# Patient Record
Sex: FEMALE | Race: BLACK OR AFRICAN AMERICAN | NOT HISPANIC OR LATINO | Employment: FULL TIME | URBAN - METROPOLITAN AREA
[De-identification: names, ages, dates, MRNs, and addresses within clinical notes are randomized per-mention and may not be internally consistent; named-entity substitution may affect disease eponyms.]

---

## 2021-07-20 ENCOUNTER — CLINICAL SUPPORT (OUTPATIENT)
Dept: OTHER | Facility: CLINIC | Age: 51
End: 2021-07-20
Payer: COMMERCIAL

## 2021-07-20 DIAGNOSIS — Z00.8 ENCOUNTER FOR OTHER GENERAL EXAMINATION: ICD-10-CM

## 2021-07-21 VITALS — HEIGHT: 68 IN

## 2021-07-21 LAB
GLUCOSE SERPL-MCNC: 81 MG/DL (ref 60–140)
HDLC SERPL-MCNC: 74 MG/DL
POC CHOLESTEROL, LDL (DOCK): 153 MG/DL
POC CHOLESTEROL, TOTAL: 248 MG/DL
TRIGL SERPL-MCNC: 109 MG/DL

## 2021-07-30 ENCOUNTER — TELEPHONE (OUTPATIENT)
Dept: INTERNAL MEDICINE | Facility: CLINIC | Age: 51
End: 2021-07-30

## 2021-07-30 ENCOUNTER — HOSPITAL ENCOUNTER (OUTPATIENT)
Dept: RADIOLOGY | Facility: HOSPITAL | Age: 51
Discharge: HOME | End: 2021-07-30
Attending: NURSE PRACTITIONER
Payer: COMMERCIAL

## 2021-07-30 ENCOUNTER — OFFICE VISIT (OUTPATIENT)
Dept: INTERNAL MEDICINE | Facility: CLINIC | Age: 51
End: 2021-07-30
Payer: COMMERCIAL

## 2021-07-30 VITALS
SYSTOLIC BLOOD PRESSURE: 138 MMHG | BODY MASS INDEX: 42.33 KG/M2 | DIASTOLIC BLOOD PRESSURE: 90 MMHG | WEIGHT: 279.31 LBS | HEART RATE: 53 BPM | HEIGHT: 68 IN | OXYGEN SATURATION: 99 %

## 2021-07-30 DIAGNOSIS — I83.90 VARICOSE VEINS OF LOWER EXTREMITY, UNSPECIFIED LATERALITY, UNSPECIFIED WHETHER COMPLICATED: ICD-10-CM

## 2021-07-30 DIAGNOSIS — I10 ESSENTIAL HYPERTENSION: ICD-10-CM

## 2021-07-30 DIAGNOSIS — M54.9 DORSALGIA, UNSPECIFIED: ICD-10-CM

## 2021-07-30 DIAGNOSIS — M54.10 RADICULOPATHY, UNSPECIFIED SPINAL REGION: Primary | ICD-10-CM

## 2021-07-30 DIAGNOSIS — M79.89 RIGHT LEG SWELLING: ICD-10-CM

## 2021-07-30 DIAGNOSIS — M54.10 RADICULOPATHY, UNSPECIFIED SPINAL REGION: ICD-10-CM

## 2021-07-30 PROCEDURE — 99204 OFFICE O/P NEW MOD 45 MIN: CPT | Mod: S$GLB,,, | Performed by: NURSE PRACTITIONER

## 2021-07-30 PROCEDURE — 72110 X-RAY EXAM L-2 SPINE 4/>VWS: CPT | Mod: TC

## 2021-07-30 PROCEDURE — 72110 XR LUMBAR SPINE COMPLETE 5 VIEW: ICD-10-PCS | Mod: 26,,, | Performed by: RADIOLOGY

## 2021-07-30 PROCEDURE — 3075F PR MOST RECENT SYSTOLIC BLOOD PRESS GE 130-139MM HG: ICD-10-PCS | Mod: CPTII,S$GLB,, | Performed by: NURSE PRACTITIONER

## 2021-07-30 PROCEDURE — 1160F RVW MEDS BY RX/DR IN RCRD: CPT | Mod: CPTII,S$GLB,, | Performed by: NURSE PRACTITIONER

## 2021-07-30 PROCEDURE — 3080F DIAST BP >= 90 MM HG: CPT | Mod: CPTII,S$GLB,, | Performed by: NURSE PRACTITIONER

## 2021-07-30 PROCEDURE — 72202 X-RAY EXAM SI JOINTS 3/> VWS: CPT | Mod: TC

## 2021-07-30 PROCEDURE — 72202 X-RAY EXAM SI JOINTS 3/> VWS: CPT | Mod: 26,,, | Performed by: RADIOLOGY

## 2021-07-30 PROCEDURE — 1159F MED LIST DOCD IN RCRD: CPT | Mod: CPTII,S$GLB,, | Performed by: NURSE PRACTITIONER

## 2021-07-30 PROCEDURE — 99999 PR PBB SHADOW E&M-EST. PATIENT-LVL IV: ICD-10-PCS | Mod: PBBFAC,,, | Performed by: NURSE PRACTITIONER

## 2021-07-30 PROCEDURE — 99204 PR OFFICE/OUTPT VISIT, NEW, LEVL IV, 45-59 MIN: ICD-10-PCS | Mod: S$GLB,,, | Performed by: NURSE PRACTITIONER

## 2021-07-30 PROCEDURE — 72110 X-RAY EXAM L-2 SPINE 4/>VWS: CPT | Mod: 26,,, | Performed by: RADIOLOGY

## 2021-07-30 PROCEDURE — 3008F PR BODY MASS INDEX (BMI) DOCUMENTED: ICD-10-PCS | Mod: CPTII,S$GLB,, | Performed by: NURSE PRACTITIONER

## 2021-07-30 PROCEDURE — 72202 XR SACROILIAC JOINTS COMPLETE: ICD-10-PCS | Mod: 26,,, | Performed by: RADIOLOGY

## 2021-07-30 PROCEDURE — 3075F SYST BP GE 130 - 139MM HG: CPT | Mod: CPTII,S$GLB,, | Performed by: NURSE PRACTITIONER

## 2021-07-30 PROCEDURE — 99999 PR PBB SHADOW E&M-EST. PATIENT-LVL IV: CPT | Mod: PBBFAC,,, | Performed by: NURSE PRACTITIONER

## 2021-07-30 PROCEDURE — 3008F BODY MASS INDEX DOCD: CPT | Mod: CPTII,S$GLB,, | Performed by: NURSE PRACTITIONER

## 2021-07-30 PROCEDURE — 1126F PR PAIN SEVERITY QUANTIFIED, NO PAIN PRESENT: ICD-10-PCS | Mod: CPTII,S$GLB,, | Performed by: NURSE PRACTITIONER

## 2021-07-30 PROCEDURE — 1160F PR REVIEW ALL MEDS BY PRESCRIBER/CLIN PHARMACIST DOCUMENTED: ICD-10-PCS | Mod: CPTII,S$GLB,, | Performed by: NURSE PRACTITIONER

## 2021-07-30 PROCEDURE — 1159F PR MEDICATION LIST DOCUMENTED IN MEDICAL RECORD: ICD-10-PCS | Mod: CPTII,S$GLB,, | Performed by: NURSE PRACTITIONER

## 2021-07-30 PROCEDURE — 3080F PR MOST RECENT DIASTOLIC BLOOD PRESSURE >= 90 MM HG: ICD-10-PCS | Mod: CPTII,S$GLB,, | Performed by: NURSE PRACTITIONER

## 2021-07-30 PROCEDURE — 1126F AMNT PAIN NOTED NONE PRSNT: CPT | Mod: CPTII,S$GLB,, | Performed by: NURSE PRACTITIONER

## 2021-07-30 RX ORDER — LOSARTAN POTASSIUM 50 MG/1
50 TABLET ORAL DAILY
COMMUNITY
Start: 2021-07-28 | End: 2022-04-26 | Stop reason: SDUPTHER

## 2021-07-30 RX ORDER — MULTIVIT WITH IRON,MINERALS
2 TABLET,CHEWABLE ORAL DAILY
COMMUNITY

## 2021-08-03 ENCOUNTER — HOSPITAL ENCOUNTER (OUTPATIENT)
Dept: CARDIOLOGY | Facility: HOSPITAL | Age: 51
Discharge: HOME | End: 2021-08-03
Attending: NURSE PRACTITIONER
Payer: COMMERCIAL

## 2021-08-03 DIAGNOSIS — I83.90 VARICOSE VEINS OF LOWER EXTREMITY, UNSPECIFIED LATERALITY, UNSPECIFIED WHETHER COMPLICATED: ICD-10-CM

## 2021-08-03 DIAGNOSIS — M54.10 RADICULOPATHY, UNSPECIFIED SPINAL REGION: ICD-10-CM

## 2021-08-03 DIAGNOSIS — I10 ESSENTIAL HYPERTENSION: ICD-10-CM

## 2021-08-03 DIAGNOSIS — M54.9 DORSALGIA, UNSPECIFIED: ICD-10-CM

## 2021-08-03 LAB
LEFT ABI: 1.14
LEFT ARM BP: 169 MMHG
LEFT DORSALIS PEDIS: 193 MMHG
LEFT POSTERIOR TIBIAL: 181 MMHG
RIGHT ABI: 1.15
RIGHT ARM BP: 168 MMHG
RIGHT DORSALIS PEDIS: 195 MMHG
RIGHT POSTERIOR TIBIAL: 194 MMHG

## 2021-08-03 PROCEDURE — 93922 UPR/L XTREMITY ART 2 LEVELS: CPT

## 2021-08-03 PROCEDURE — 93922 ANKLE BRACHIAL INDICES (ABI): ICD-10-PCS | Mod: 26,,, | Performed by: INTERNAL MEDICINE

## 2021-08-03 PROCEDURE — 93922 UPR/L XTREMITY ART 2 LEVELS: CPT | Mod: 26,,, | Performed by: INTERNAL MEDICINE

## 2021-08-11 ENCOUNTER — PATIENT MESSAGE (OUTPATIENT)
Dept: INTERNAL MEDICINE | Facility: CLINIC | Age: 51
End: 2021-08-11

## 2021-08-17 ENCOUNTER — HOSPITAL ENCOUNTER (OUTPATIENT)
Dept: RADIOLOGY | Facility: HOSPITAL | Age: 51
Discharge: HOME | End: 2021-08-17
Attending: NURSE PRACTITIONER
Payer: COMMERCIAL

## 2021-08-17 DIAGNOSIS — I83.90 VARICOSE VEINS OF LOWER EXTREMITY, UNSPECIFIED LATERALITY, UNSPECIFIED WHETHER COMPLICATED: ICD-10-CM

## 2021-08-17 DIAGNOSIS — I10 ESSENTIAL HYPERTENSION: ICD-10-CM

## 2021-08-17 DIAGNOSIS — M54.9 DORSALGIA, UNSPECIFIED: ICD-10-CM

## 2021-08-17 DIAGNOSIS — M54.10 RADICULOPATHY, UNSPECIFIED SPINAL REGION: ICD-10-CM

## 2021-08-17 PROCEDURE — 93971 EXTREMITY STUDY: CPT | Mod: TC,RT

## 2021-08-17 PROCEDURE — 93971 EXTREMITY STUDY: CPT | Mod: 26,RT,, | Performed by: RADIOLOGY

## 2021-08-17 PROCEDURE — 93971 US LOWER EXTREMITY VEINS RIGHT: ICD-10-PCS | Mod: 26,RT,, | Performed by: RADIOLOGY

## 2021-11-02 ENCOUNTER — IMMUNIZATION (OUTPATIENT)
Dept: INTERNAL MEDICINE | Facility: CLINIC | Age: 51
End: 2021-11-02
Payer: COMMERCIAL

## 2021-11-02 DIAGNOSIS — Z23 NEED FOR VACCINATION: Primary | ICD-10-CM

## 2021-11-02 PROCEDURE — 91300 COVID-19, MRNA, LNP-S, PF, 30 MCG/0.3 ML DOSE VACCINE: CPT | Mod: PBBFAC | Performed by: INTERNAL MEDICINE

## 2021-11-02 PROCEDURE — 0003A COVID-19, MRNA, LNP-S, PF, 30 MCG/0.3 ML DOSE VACCINE: CPT | Mod: CV19,PBBFAC | Performed by: INTERNAL MEDICINE

## 2022-03-21 ENCOUNTER — PATIENT MESSAGE (OUTPATIENT)
Dept: ADMINISTRATIVE | Facility: HOSPITAL | Age: 52
End: 2022-03-21
Payer: COMMERCIAL

## 2022-03-22 DIAGNOSIS — Z12.11 SCREENING FOR COLON CANCER: ICD-10-CM

## 2022-04-13 ENCOUNTER — OFFICE VISIT (OUTPATIENT)
Dept: OPTOMETRY | Facility: CLINIC | Age: 52
End: 2022-04-13
Payer: COMMERCIAL

## 2022-04-13 DIAGNOSIS — Z01.00 EXAMINATION OF EYES AND VISION: Primary | ICD-10-CM

## 2022-04-13 DIAGNOSIS — H52.03 HYPEROPIA WITH PRESBYOPIA, BILATERAL: ICD-10-CM

## 2022-04-13 DIAGNOSIS — H52.4 HYPEROPIA WITH PRESBYOPIA, BILATERAL: ICD-10-CM

## 2022-04-13 PROCEDURE — 99999 PR PBB SHADOW E&M-EST. PATIENT-LVL II: ICD-10-PCS | Mod: PBBFAC,,, | Performed by: OPTOMETRIST

## 2022-04-13 PROCEDURE — 92004 PR EYE EXAM, NEW PATIENT,COMPREHESV: ICD-10-PCS | Mod: S$GLB,,, | Performed by: OPTOMETRIST

## 2022-04-13 PROCEDURE — 92015 DETERMINE REFRACTIVE STATE: CPT | Mod: S$GLB,,, | Performed by: OPTOMETRIST

## 2022-04-13 PROCEDURE — 92004 COMPRE OPH EXAM NEW PT 1/>: CPT | Mod: S$GLB,,, | Performed by: OPTOMETRIST

## 2022-04-13 PROCEDURE — 99999 PR PBB SHADOW E&M-EST. PATIENT-LVL II: CPT | Mod: PBBFAC,,, | Performed by: OPTOMETRIST

## 2022-04-13 PROCEDURE — 92015 PR REFRACTION: ICD-10-PCS | Mod: S$GLB,,, | Performed by: OPTOMETRIST

## 2022-04-13 NOTE — PROGRESS NOTES
Subjective:       Patient ID: Gabriella Gilbert is a 51 y.o. female      Chief Complaint   Patient presents with    Concerns About Ocular Health     History of Present Illness  Dls: 1 yr  vision works     52 y/o female presents today with c/o blurry vision at distance and near ou.   Pt wears pal's.     No tearing  No itching   No burning  No pain  No ha's  No floaters  No flashes    Eye meds  None       Assessment/Plan:     1. Examination of eyes and vision  Eyemed vision    2. Hyperopia with presbyopia, bilateral  Educated patient on refractive error and discussed lens options. Dispensed updated spectacle Rx. Educated about adaptation period to new specs.    Eyeglass Final Rx     Eyeglass Final Rx       Sphere Cylinder Axis Add    Right [place] +0.50 070 +2.00    Left +0.75 Sphere  +2.00    Expiration Date: 4/13/2023                  Follow up in about 1 year (around 4/13/2023).

## 2022-04-20 ENCOUNTER — OFFICE VISIT (OUTPATIENT)
Dept: URGENT CARE | Facility: CLINIC | Age: 52
End: 2022-04-20
Payer: COMMERCIAL

## 2022-04-20 VITALS
WEIGHT: 280 LBS | RESPIRATION RATE: 18 BRPM | OXYGEN SATURATION: 98 % | SYSTOLIC BLOOD PRESSURE: 156 MMHG | DIASTOLIC BLOOD PRESSURE: 79 MMHG | BODY MASS INDEX: 41.47 KG/M2 | HEIGHT: 69 IN | TEMPERATURE: 98 F | HEART RATE: 76 BPM

## 2022-04-20 DIAGNOSIS — R51.9 NONINTRACTABLE EPISODIC HEADACHE, UNSPECIFIED HEADACHE TYPE: ICD-10-CM

## 2022-04-20 DIAGNOSIS — I10 ELEVATED BLOOD PRESSURE READING IN OFFICE WITH DIAGNOSIS OF HYPERTENSION: Primary | ICD-10-CM

## 2022-04-20 PROCEDURE — 3077F PR MOST RECENT SYSTOLIC BLOOD PRESSURE >= 140 MM HG: ICD-10-PCS | Mod: CPTII,S$GLB,, | Performed by: PHYSICIAN ASSISTANT

## 2022-04-20 PROCEDURE — 1160F PR REVIEW ALL MEDS BY PRESCRIBER/CLIN PHARMACIST DOCUMENTED: ICD-10-PCS | Mod: CPTII,S$GLB,, | Performed by: PHYSICIAN ASSISTANT

## 2022-04-20 PROCEDURE — 3008F BODY MASS INDEX DOCD: CPT | Mod: CPTII,S$GLB,, | Performed by: PHYSICIAN ASSISTANT

## 2022-04-20 PROCEDURE — 99213 PR OFFICE/OUTPT VISIT, EST, LEVL III, 20-29 MIN: ICD-10-PCS | Mod: S$GLB,,, | Performed by: PHYSICIAN ASSISTANT

## 2022-04-20 PROCEDURE — 1159F MED LIST DOCD IN RCRD: CPT | Mod: CPTII,S$GLB,, | Performed by: PHYSICIAN ASSISTANT

## 2022-04-20 PROCEDURE — 3078F PR MOST RECENT DIASTOLIC BLOOD PRESSURE < 80 MM HG: ICD-10-PCS | Mod: CPTII,S$GLB,, | Performed by: PHYSICIAN ASSISTANT

## 2022-04-20 PROCEDURE — 3008F PR BODY MASS INDEX (BMI) DOCUMENTED: ICD-10-PCS | Mod: CPTII,S$GLB,, | Performed by: PHYSICIAN ASSISTANT

## 2022-04-20 PROCEDURE — 3078F DIAST BP <80 MM HG: CPT | Mod: CPTII,S$GLB,, | Performed by: PHYSICIAN ASSISTANT

## 2022-04-20 PROCEDURE — 1160F RVW MEDS BY RX/DR IN RCRD: CPT | Mod: CPTII,S$GLB,, | Performed by: PHYSICIAN ASSISTANT

## 2022-04-20 PROCEDURE — 4010F ACE/ARB THERAPY RXD/TAKEN: CPT | Mod: CPTII,S$GLB,, | Performed by: PHYSICIAN ASSISTANT

## 2022-04-20 PROCEDURE — 3077F SYST BP >= 140 MM HG: CPT | Mod: CPTII,S$GLB,, | Performed by: PHYSICIAN ASSISTANT

## 2022-04-20 PROCEDURE — 1159F PR MEDICATION LIST DOCUMENTED IN MEDICAL RECORD: ICD-10-PCS | Mod: CPTII,S$GLB,, | Performed by: PHYSICIAN ASSISTANT

## 2022-04-20 PROCEDURE — 99213 OFFICE O/P EST LOW 20 MIN: CPT | Mod: S$GLB,,, | Performed by: PHYSICIAN ASSISTANT

## 2022-04-20 PROCEDURE — 4010F PR ACE/ARB THEARPY RXD/TAKEN: ICD-10-PCS | Mod: CPTII,S$GLB,, | Performed by: PHYSICIAN ASSISTANT

## 2022-04-20 NOTE — PATIENT INSTRUCTIONS
- Rest.    - Drink plenty of fluids.    - Acetaminophen (tylenol) directed as needed for fever/pain. Avoid tylenol if you have a history of liver disease. Do not take ibuprofen if you have a history of GI bleeding, kidney disease, or if you take blood thinners.     - losartan can be taken at maximum dose of 100 mg daily.  - Follow up with your PCP or specialty clinic as directed in the next 1-2 weeks if not improved or as needed.  You can call [phone] to schedule an appointment with the appropriate provider.    - Go to the ER or seek medical attention immediately if you develop new or worsening symptoms.     - You must understand that you have received an Urgent Care treatment only and that you may be released before all of your medical problems are known or treated.   - You, the patient, will arrange for follow up care as instructed.   - If your condition worsens or fails to improve we recommend that you receive another evaluation at the ER immediately or contact your PCP to discuss your concerns or return here.    Elevated Blood Pressure  Your blood pressure was elevated during your visit to the urgent care.  It was not so high that immediate care was needed but it is recommended that you monitor your blood pressure over the next week or two to make sure that it is not staying elevated.  Please have your blood pressure taken 2-3 times daily at different times of the day.  Write all of those blood pressures down and record the time that they were taken.  Keep all that information and take it with you to see your Primary Care Physician.  If your blood pressure is consistently above 140/90 you will need to follow up with your PCP more quickly

## 2022-04-20 NOTE — PROGRESS NOTES
"Subjective:       Patient ID: Gabriella Gilbert is a 51 y.o. female.    Vitals:  height is 5' 9" (1.753 m) and weight is 127 kg (280 lb). Her oral temperature is 97.9 °F (36.6 °C). Her blood pressure is 156/79 (abnormal) and her pulse is 76. Her respiration is 18 and oxygen saturation is 98%.     Chief Complaint: Headache      Patient presents for evaluation of elevated blood pressure headache.  Patient states that about 3-4 weeks ago she increased losartan from 50 mg to 75 mg.  Since then, her blood pressure has been very well controlled with  Readings usually from 110-120 systolic.  However, patient states that today she had a bad headache, so she checked her blood pressure and it was 160/110.  She then took another 25 mg of her losartan to total 100 mg for today.  Of note, patient states that she has had headaches intermittently for the past few weeks.  She does note that 1 month ago she started a new diet with multiple new supplements, many of which contain caffeine.  Patient states that she did not take these today.  Denies any other new associated symptoms.  Patient states that she did have a lot going on earlier today as she had to be in court.     Headache   This is a new problem. The current episode started 1 to 4 weeks ago. The problem has been gradually improving. The quality of the pain is described as pulsating and throbbing. The pain is at a severity of 8/10. Pertinent negatives include no abdominal pain, abnormal behavior, anorexia, back pain, blurred vision, coughing, dizziness, drainage, ear pain, eye pain, eye redness, eye watering, facial sweating, fever, hearing loss, insomnia, loss of balance, muscle aches, nausea, neck pain, numbness, phonophobia, photophobia, rhinorrhea, scalp tenderness, seizures, sinus pressure, sore throat, swollen glands, tingling, tinnitus, visual change, vomiting, weakness or weight loss. Nothing aggravates the symptoms. Treatments tried: pt took second dose of losartan. The " treatment provided mild relief. There is no history of migraine headaches.       Constitution: Negative for chills, sweating, fatigue and fever.   HENT: Negative for ear pain, tinnitus, hearing loss, congestion, sinus pressure and sore throat.    Neck: Negative for neck pain and neck stiffness.   Cardiovascular: Negative for chest pain, leg swelling and palpitations.   Eyes: Negative for eye itching, eye pain, eye redness, photophobia and blurred vision.   Respiratory: Negative for cough, sputum production and shortness of breath.    Gastrointestinal: Negative for abdominal pain, nausea and vomiting.   Genitourinary: Negative for dysuria, frequency, urgency, flank pain and hematuria.   Musculoskeletal: Negative for back pain.   Skin: Negative for color change and rash.   Neurological: Positive for headaches. Negative for dizziness, history of vertigo, light-headedness, passing out, facial drooping, speech difficulty, coordination disturbances, loss of balance, history of migraines, disorientation, altered mental status, loss of consciousness, numbness, tingling, seizures and tremors.   Psychiatric/Behavioral: Negative for altered mental status and disorientation. The patient does not have insomnia.        Objective:      Physical Exam   Constitutional: She is oriented to person, place, and time. She appears well-developed.  Non-toxic appearance. She does not appear ill. No distress.   HENT:   Head: Normocephalic and atraumatic.   Ears:   Right Ear: Hearing, tympanic membrane, external ear and ear canal normal.   Left Ear: Hearing, tympanic membrane, external ear and ear canal normal.   Nose: Nose normal. No mucosal edema, rhinorrhea or nasal deformity. No epistaxis. Right sinus exhibits no maxillary sinus tenderness and no frontal sinus tenderness. Left sinus exhibits no maxillary sinus tenderness and no frontal sinus tenderness.   Mouth/Throat: Uvula is midline, oropharynx is clear and moist and mucous membranes  are normal. No trismus in the jaw. Normal dentition. No uvula swelling. No posterior oropharyngeal erythema.   Eyes: Conjunctivae, EOM and lids are normal. Pupils are equal, round, and reactive to light. No scleral icterus.   Neck: Trachea normal and phonation normal. Neck supple. No neck rigidity present.   Cardiovascular: Normal rate, regular rhythm, normal heart sounds and normal pulses.   Pulmonary/Chest: Effort normal and breath sounds normal. No respiratory distress.   Abdominal: Normal appearance and bowel sounds are normal. She exhibits no distension. Soft. There is no abdominal tenderness.   Musculoskeletal: Normal range of motion.         General: No deformity. Normal range of motion.      Right lower leg: No edema.      Left lower leg: No edema.   Neurological: no focal deficit. She is alert and oriented to person, place, and time. She displays no weakness, no atrophy, facial symmetry and no dysarthria. No cranial nerve deficit or sensory deficit. She exhibits normal muscle tone. She displays no seizure activity. Coordination and gait normal. GCS eye subscore is 4. GCS verbal subscore is 5. GCS motor subscore is 6.   Skin: Skin is warm, dry, intact, not diaphoretic and not pale.   Psychiatric: Her speech is normal and behavior is normal. Judgment and thought content normal.   Nursing note and vitals reviewed.        Assessment:       1. Elevated blood pressure reading in office with diagnosis of hypertension    2. Nonintractable episodic headache, unspecified headache type          Plan:        instructed to increase fluids.  Monitor salt intake.  Blood pressure elevated today at 156/79.  Discussed maximum dose of losartan is 100 mg.  Instructed to monitor blood pressure at home, keep a log.  If consistently elevated greater than 140/90, follow up closely with primary care.  However, before today she has not consistently had elevated blood pressure reading the past month, so I would not recommend adding  another blood pressure medication based on this.  No red flag signs or symptoms at this time.  Patient appears well on exam and rest of vitals are within normal limits.   Discussed that also new supplements / diet could be contributing to headaches, as well as dehydration or excess caffeine intake/ withdrawal could be contributing. All questions answered.  Patient expressed understanding, agrees with plan of care.   Elevated blood pressure reading in office with diagnosis of hypertension    Nonintractable episodic headache, unspecified headache type      Patient Instructions   - Rest.    - Drink plenty of fluids.    - Acetaminophen (tylenol) directed as needed for fever/pain. Avoid tylenol if you have a history of liver disease. Do not take ibuprofen if you have a history of GI bleeding, kidney disease, or if you take blood thinners.     - losartan can be taken at maximum dose of 100 mg daily.  - Follow up with your PCP or specialty clinic as directed in the next 1-2 weeks if not improved or as needed.  You can call [phone] to schedule an appointment with the appropriate provider.    - Go to the ER or seek medical attention immediately if you develop new or worsening symptoms.     - You must understand that you have received an Urgent Care treatment only and that you may be released before all of your medical problems are known or treated.   - You, the patient, will arrange for follow up care as instructed.   - If your condition worsens or fails to improve we recommend that you receive another evaluation at the ER immediately or contact your PCP to discuss your concerns or return here.    Elevated Blood Pressure  Your blood pressure was elevated during your visit to the urgent care.  It was not so high that immediate care was needed but it is recommended that you monitor your blood pressure over the next week or two to make sure that it is not staying elevated.  Please have your blood pressure taken 2-3 times  daily at different times of the day.  Write all of those blood pressures down and record the time that they were taken.  Keep all that information and take it with you to see your Primary Care Physician.  If your blood pressure is consistently above 140/90 you will need to follow up with your PCP more quickly

## 2022-04-22 ENCOUNTER — TELEPHONE (OUTPATIENT)
Dept: URGENT CARE | Facility: CLINIC | Age: 52
End: 2022-04-22
Payer: COMMERCIAL

## 2022-04-24 NOTE — PROGRESS NOTES
"INTERNAL MEDICINE CLINIC - SAME DAY APPOINTMENT  Progress Note    PRESENTING HISTORY     PCP: TAYLER Lizama    Chief Complaint/Reason for Visit:   No chief complaint on file.      History of Present Illness & ROS : Ms. Gabriella Gilbert is a 51 y.o. female.    Same day appt.   No longer has a primary.   Very pleasant lady.   Reports that "was doing well with the Losartan 50, up until the past 3 weeks, with some stress" in life that she feels may have resulted in uncontrolled, symptomatic (headaches) levels, resulting in her self adjusting the dose to 75 mg and continuing to monitor with most recent being 120s/70s as of 'last night'. She is consistent with taking all of her medications.   No resting or with activity SOB, dizziness or chest pain.     Review of Systems:  Eyes: denies visual changes at this time denies floaters   ENT: no nasal congestion or sore throat  Respiratory: no cough or shorness of breath  Cardiovascular: no chest pain or palpitations  Gastrointestinal: no nausea or vomiting, no abdominal pain or change in bowel habits  Genitourinary: no hematuria or dysuria; denies frequency  Hematologic/Lymphatic: no easy bruising or lymphadenopathy  Musculoskeletal: no arthralgias or myalgias  Neurological: no seizures or tremors  Endocrine: no heat or cold intolerance      PAST HISTORY:     Past Medical History:   Diagnosis Date    Hypertension        Past Surgical History:   Procedure Laterality Date    BARIATRIC SURGERY      2018       Family History   Problem Relation Age of Onset    Thyroid cancer Mother     Hypertension Mother     Cataracts Mother     No Known Problems Father     No Known Problems Sister     No Known Problems Brother     No Known Problems Maternal Aunt     No Known Problems Maternal Uncle     No Known Problems Paternal Aunt     No Known Problems Paternal Uncle     Cataracts Maternal Grandmother     No Known Problems Maternal Grandfather     No Known Problems " Paternal Grandmother     No Known Problems Paternal Grandfather        Social History     Socioeconomic History    Marital status:    Tobacco Use    Smoking status: Never Smoker    Smokeless tobacco: Never Used   Substance and Sexual Activity    Alcohol use: Yes     Comment: rarely       MEDICATIONS & ALLERGIES:     Current Outpatient Medications on File Prior to Visit   Medication Sig Dispense Refill    losartan (COZAAR) 50 MG tablet Take 50 mg by mouth once daily.      pediatric multivit-iron-min (FLINTSTONES COMPLETE, IRON,) Chew Take 2 tablets by mouth once daily.       No current facility-administered medications on file prior to visit.        Review of patient's allergies indicates:  No Known Allergies    Medications Reconciliation:   I have reconciled the patient's home medications with the patient/family. I have updated all changes.  Refer to After-Visit Medication List.    OBJECTIVE:     Vital Signs:  There were no vitals filed for this visit.  Wt Readings from Last 3 Encounters:   04/20/22 1325 127 kg (280 lb)   07/30/21 0700 126.7 kg (279 lb 5.2 oz)     There is no height or weight on file to calculate BMI.   Wt Readings from Last 3 Encounters:   04/20/22 127 kg (280 lb)   07/30/21 126.7 kg (279 lb 5.2 oz)     Temp Readings from Last 3 Encounters:   04/20/22 97.9 °F (36.6 °C) (Oral)     BP Readings from Last 3 Encounters:   04/20/22 (!) 156/79   07/30/21 (!) 138/90     Pulse Readings from Last 3 Encounters:   04/20/22 76   07/30/21 (!) 53        Physical Exam:  (Focused Exa,)  General: Well developed, well nourished. No distress.  HEENT: Head is normocephalic, atraumatic  Eyes: Clear conjunctiva.  Neck: Supple, symmetrical neck; trachea midline.  Cardiovascular: Heart with regular rate and rhythm. No murmurs, gallops or rubs  Extremities: No pitting LE edema. Pulses 2+ and symmetric.   Skin: Skin color, texture, turgor normal. No rashes.  Musculoskeletal: Normal gait.   Neurologic: Normal  strength and tone. No focal numbness or weakness.   Psychiatric: Not depressed.        Laboratory  Lab Results   Component Value Date    WBC 3.70 (L) 07/30/2021    HGB 12.8 07/30/2021    HCT 40.1 07/30/2021     07/30/2021    ALT 11 07/30/2021    AST 21 07/30/2021     07/30/2021    K 3.8 07/30/2021     07/30/2021    CREATININE 0.8 07/30/2021    BUN 11 07/30/2021    CO2 28 07/30/2021    TSH 1.111 07/30/2021    HGBA1C 5.1 07/30/2021         ASSESSMENT & PLAN:     Same day appt.     Hypertension, unspecified type, primary, essential:   4/2022: BP: 156/79 in  appt.  Today: 136/88 (asymptomatic; has been on 75 mg daily for the past 3 weeks; continue; recommend that she messages me via portal in 2 weeks with her continued BP  response to therapy; if needed, consider adding low dose HCTZ to regimen; checking renal and electrolyte markers today to see if permissible in the event of needing to do so)  -     Basic Metabolic Panel; Future; Expected date: 04/26/2022 (surveillance to ascertain no imbalance with electrolytes)  -     losartan (COZAAR) 50 MG tablet; Take 1.5 tablets (75 mg total) by mouth once daily.  Dispense: 180 tablet; Refill:     *Due Annual in 7/2022. She will establish with new PCP. Former is no longer with [facility].          Medication List          Accurate as of April 26, 2022 10:33 AM. If you have any questions, ask your nurse or doctor.            CHANGE how you take these medications    losartan 50 MG tablet  Commonly known as: COZAAR  Take 1.5 tablets (75 mg total) by mouth once daily.  What changed: how much to take  Changed by: TAYLER Lizama        CONTINUE taking these medications    FLINTSTONES COMPLETE (IRON) Chew  Generic drug: pediatric multivit-iron-min           Where to Get Your Medications      These medications were sent to [facility]/pharmacy #[id number] - [place], [place] - [address]  [address] [place] [address]    Phone: [phone]    · losartan 50 MG tablet       Signing Physician:  TAYLER Lizama

## 2022-04-26 ENCOUNTER — LAB VISIT (OUTPATIENT)
Dept: LAB | Facility: HOSPITAL | Age: 52
End: 2022-04-26
Payer: COMMERCIAL

## 2022-04-26 ENCOUNTER — OFFICE VISIT (OUTPATIENT)
Dept: INTERNAL MEDICINE | Facility: CLINIC | Age: 52
End: 2022-04-26
Payer: COMMERCIAL

## 2022-04-26 VITALS
BODY MASS INDEX: 43.14 KG/M2 | OXYGEN SATURATION: 100 % | HEART RATE: 52 BPM | HEIGHT: 69 IN | SYSTOLIC BLOOD PRESSURE: 136 MMHG | DIASTOLIC BLOOD PRESSURE: 88 MMHG | WEIGHT: 291.25 LBS

## 2022-04-26 DIAGNOSIS — I10 HYPERTENSION, UNSPECIFIED TYPE: ICD-10-CM

## 2022-04-26 DIAGNOSIS — I10 HYPERTENSION, UNSPECIFIED TYPE: Primary | ICD-10-CM

## 2022-04-26 LAB
ANION GAP SERPL CALC-SCNC: 10 MMOL/L (ref 8–16)
BUN SERPL-MCNC: 12 MG/DL (ref 6–20)
CALCIUM SERPL-MCNC: 9.6 MG/DL (ref 8.7–10.5)
CHLORIDE SERPL-SCNC: 101 MMOL/L (ref 95–110)
CO2 SERPL-SCNC: 31 MMOL/L (ref 23–29)
CREAT SERPL-MCNC: 0.7 MG/DL (ref 0.5–1.4)
EST. GFR  (AFRICAN AMERICAN): >60 ML/MIN/1.73 M^2
EST. GFR  (NON AFRICAN AMERICAN): >60 ML/MIN/1.73 M^2
GLUCOSE SERPL-MCNC: 73 MG/DL (ref 70–110)
POTASSIUM SERPL-SCNC: 4 MMOL/L (ref 3.5–5.1)
SODIUM SERPL-SCNC: 142 MMOL/L (ref 136–145)

## 2022-04-26 PROCEDURE — 3075F PR MOST RECENT SYSTOLIC BLOOD PRESS GE 130-139MM HG: ICD-10-PCS | Mod: CPTII,S$GLB,, | Performed by: NURSE PRACTITIONER

## 2022-04-26 PROCEDURE — 99999 PR PBB SHADOW E&M-EST. PATIENT-LVL III: CPT | Mod: PBBFAC,,, | Performed by: NURSE PRACTITIONER

## 2022-04-26 PROCEDURE — 80048 BASIC METABOLIC PNL TOTAL CA: CPT | Performed by: NURSE PRACTITIONER

## 2022-04-26 PROCEDURE — 3079F PR MOST RECENT DIASTOLIC BLOOD PRESSURE 80-89 MM HG: ICD-10-PCS | Mod: CPTII,S$GLB,, | Performed by: NURSE PRACTITIONER

## 2022-04-26 PROCEDURE — 36415 COLL VENOUS BLD VENIPUNCTURE: CPT | Performed by: NURSE PRACTITIONER

## 2022-04-26 PROCEDURE — 3079F DIAST BP 80-89 MM HG: CPT | Mod: CPTII,S$GLB,, | Performed by: NURSE PRACTITIONER

## 2022-04-26 PROCEDURE — 3075F SYST BP GE 130 - 139MM HG: CPT | Mod: CPTII,S$GLB,, | Performed by: NURSE PRACTITIONER

## 2022-04-26 PROCEDURE — 4010F ACE/ARB THERAPY RXD/TAKEN: CPT | Mod: CPTII,S$GLB,, | Performed by: NURSE PRACTITIONER

## 2022-04-26 PROCEDURE — 99214 OFFICE O/P EST MOD 30 MIN: CPT | Mod: S$GLB,,, | Performed by: NURSE PRACTITIONER

## 2022-04-26 PROCEDURE — 99999 PR PBB SHADOW E&M-EST. PATIENT-LVL III: ICD-10-PCS | Mod: PBBFAC,,, | Performed by: NURSE PRACTITIONER

## 2022-04-26 PROCEDURE — 4010F PR ACE/ARB THEARPY RXD/TAKEN: ICD-10-PCS | Mod: CPTII,S$GLB,, | Performed by: NURSE PRACTITIONER

## 2022-04-26 PROCEDURE — 1159F PR MEDICATION LIST DOCUMENTED IN MEDICAL RECORD: ICD-10-PCS | Mod: CPTII,S$GLB,, | Performed by: NURSE PRACTITIONER

## 2022-04-26 PROCEDURE — 99214 PR OFFICE/OUTPT VISIT, EST, LEVL IV, 30-39 MIN: ICD-10-PCS | Mod: S$GLB,,, | Performed by: NURSE PRACTITIONER

## 2022-04-26 PROCEDURE — 3008F PR BODY MASS INDEX (BMI) DOCUMENTED: ICD-10-PCS | Mod: CPTII,S$GLB,, | Performed by: NURSE PRACTITIONER

## 2022-04-26 PROCEDURE — 3008F BODY MASS INDEX DOCD: CPT | Mod: CPTII,S$GLB,, | Performed by: NURSE PRACTITIONER

## 2022-04-26 PROCEDURE — 1159F MED LIST DOCD IN RCRD: CPT | Mod: CPTII,S$GLB,, | Performed by: NURSE PRACTITIONER

## 2022-04-26 RX ORDER — LOSARTAN POTASSIUM 50 MG/1
75 TABLET ORAL DAILY
Qty: 180 TABLET | Refills: 1 | Status: SHIPPED | OUTPATIENT
Start: 2022-04-26 | End: 2022-11-23

## 2022-04-27 ENCOUNTER — TELEPHONE (OUTPATIENT)
Dept: INTERNAL MEDICINE | Facility: CLINIC | Age: 52
End: 2022-04-27
Payer: COMMERCIAL

## 2022-04-27 ENCOUNTER — PATIENT MESSAGE (OUTPATIENT)
Dept: INTERNAL MEDICINE | Facility: CLINIC | Age: 52
End: 2022-04-27
Payer: COMMERCIAL

## 2022-06-30 ENCOUNTER — TELEPHONE (OUTPATIENT)
Dept: PRIMARY CARE CLINIC | Facility: CLINIC | Age: 52
End: 2022-06-30
Payer: COMMERCIAL

## 2022-07-06 ENCOUNTER — TELEPHONE (OUTPATIENT)
Dept: PRIMARY CARE CLINIC | Facility: CLINIC | Age: 52
End: 2022-07-06
Payer: COMMERCIAL

## 2022-07-06 NOTE — TELEPHONE ENCOUNTER
Called pt to introduce the [facility] and schedule new pt appt no answer LVM to call me [phone] and we can schedule appt

## 2023-04-06 ENCOUNTER — PATIENT MESSAGE (OUTPATIENT)
Dept: INTERNAL MEDICINE | Facility: CLINIC | Age: 53
End: 2023-04-06
Payer: COMMERCIAL

## 2023-04-10 NOTE — PROGRESS NOTES
ANNUAL VISIT NOTE     PRESENTING HISTORY     Reason for Visit:  Annual visit.    No chief complaint on file.    History of Present Illness & ROS: Ms. Gabriella Gilbert is a 52 y.o. female.  Annual.   Very pleasant lady.   Reports that she is consistent with taking her prescribed blood pressure medications,admits to falling through on follow ups. She is not fasting for labs today and will return.   She has gained weight since 4/2022, ~ 20 #s. No SOB, cough or chest wall discomforts. She is reportedly, 'trying to loss, but cannot and legs have had some swelling'. She is with voiced frustration in regards to her wt gain.    Also reports that tried Melatonin for sleep, 'does not help'. Request to be started on something different.   She will return to establish medical care with a new PCP accepting new patients.     Review of Systems:  Eyes: denies visual changes at this time denies floaters   ENT: no nasal congestion or sore throat  Respiratory: no cough or shorness of breath  Cardiovascular: no chest pain or palpitations  Gastrointestinal: no nausea or vomiting, no abdominal pain or change in bowel habits  Genitourinary: no hematuria or dysuria; denies frequency  Hematologic/Lymphatic: no easy bruising or lymphadenopathy  Musculoskeletal: no arthralgias or myalgias  Neurological: no seizures or tremors  Endocrine: no heat or cold intolerance    PAST HISTORY:     Past Medical History:   Diagnosis Date    Hypertension        Past Surgical History:   Procedure Laterality Date    BARIATRIC SURGERY      2018       Family History   Problem Relation Age of Onset    Thyroid cancer Mother     Hypertension Mother     Cataracts Mother     No Known Problems Father     No Known Problems Sister     No Known Problems Brother     No Known Problems Maternal Aunt     No Known Problems Maternal Uncle     No Known Problems Paternal Aunt     No Known Problems Paternal Uncle     Cataracts Maternal Grandmother     No Known Problems Maternal  Grandfather     No Known Problems Paternal Grandmother     No Known Problems Paternal Grandfather        Social History     Socioeconomic History    Marital status:    Tobacco Use    Smoking status: Never    Smokeless tobacco: Never   Substance and Sexual Activity    Alcohol use: Yes     Comment: rarely       MEDICATIONS & ALLERGIES:     Current Outpatient Medications on File Prior to Visit   Medication Sig Dispense Refill    losartan (COZAAR) 50 MG tablet TAKE 1.5 TABLETS (75 MG TOTAL) BY MOUTH ONCE DAILY 135 tablet 2    pediatric multivit-iron-min (FLINTSTONES COMPLETE, IRON,) Chew Take 2 tablets by mouth once daily.       No current facility-administered medications on file prior to visit.        Review of patient's allergies indicates:  No Known Allergies    Medications Reconciliation:   I have reconciled the patient's home medications and discharge medications with the patient/family. I have updated all changes.  Refer to After-Visit Medication List.    OBJECTIVE:     Vital Signs:  There were no vitals filed for this visit.  Wt Readings from Last 3 Encounters:   04/26/22 0953 132.1 kg (291 lb 3.6 oz)   04/20/22 1325 127 kg (280 lb)   07/30/21 0700 126.7 kg (279 lb 5.2 oz)     There is no height or weight on file to calculate BMI.   Wt Readings from Last 3 Encounters:   04/12/23 (!) 137.2 kg (302 lb 7.5 oz)   04/26/22 132.1 kg (291 lb 3.6 oz)   04/20/22 127 kg (280 lb)     Temp Readings from Last 3 Encounters:   04/20/22 97.9 °F (36.6 °C) (Oral)     BP Readings from Last 3 Encounters:   04/12/23 (!) 142/84   04/26/22 136/88   04/20/22 (!) 156/79     Pulse Readings from Last 3 Encounters:   04/12/23 (!) 58   04/26/22 (!) 52   04/20/22 76       Physical Exam:  General: Well developed, well nourished. No distress.  HEENT: Head is normocephalic, atraumatic; ears are normal.   Eyes: Clear conjunctiva.  Neck: Supple, symmetrical neck; trachea midline.  Lungs: Clear to auscultation bilaterally and normal  respiratory effort.  Cardiovascular: Heart with regular rate and rhythm. No murmurs, gallops or rubs  Extremities: No pitting LE edema on today's exam. Pulses 2+ and symmetric.   Abdomen: Abdomen is soft, non-tender, distended with normal bowel sounds.  Skin: Skin color, texture, turgor normal. No rashes.  Musculoskeletal: Normal gait.   Neurologic: Normal strength and tone. No focal numbness or weakness.     Laboratory  Lab Results   Component Value Date    WBC 3.70 (L) 07/30/2021    HGB 12.8 07/30/2021    HCT 40.1 07/30/2021     07/30/2021    ALT 11 07/30/2021    AST 21 07/30/2021     04/26/2022    K 4.0 04/26/2022     04/26/2022    CREATININE 0.7 04/26/2022    BUN 12 04/26/2022    CO2 31 (H) 04/26/2022    TSH 1.111 07/30/2021    HGBA1C 5.1 07/30/2021         ASSESSMENT & PLAN:   *Missed to follow ups. Has not est'd with a new PCP.  Last seen: 4/2022.   Annual physical exam  -     Comprehensive Metabolic Panel; Future; Expected date: 04/12/2023  -     CBC Auto Differential; Future; Expected date: 04/12/2023  -     Lipid Panel; Future; Expected date: 04/12/2023  -     Hepatitis C Antibody; Future; Expected date: 04/12/2023  -     Hemoglobin A1C; Future; Expected date: 04/12/2023  -     TSH; Future; Expected date: 04/12/2023  -     Vitamin D; Future; Expected date: 04/12/2023  -     Ambulatory referral/consult to Endo Procedure ; Future; Expected date: 04/13/2023  -     Mammo Digital Screening Bilat w/ Josh; Future; Expected date: 04/12/2023    Essential hypertension  BP Readings from Last 3 Encounters:   04/12/23 (!) 142/84   04/26/22 136/88   04/20/22 (!) 156/79   *Longstanding uncontrol; missed to rec'd follow ups.   Will proceed with checking BNP, EKG and Echo in the setting of BP issues over time; if studies depict, will refer to Cardiology and will need optimization of pharm regimen. Have explained this to Gabriella today and voiced understanding.   -     losartan (COZAAR) 50 MG tablet;  Take 1.5 tablets (75 mg total) by mouth once daily.  Dispense: 135 tablet; Refill: 3    Fluid retention in legs  *Longstanding uncontrol; missed to rec'd follow ups.   Will proceed with checking BNP, EKG and Echo in the setting of BP issues over time; if studies depict, will refer to Cardiology and will need optimization of pharm regimen. Have explained this to Gabriella today and voiced understanding.   -     Comprehensive Metabolic Panel; Future; Expected date: 04/12/2023  -     B-TYPE NATRIURETIC PEPTIDE; Future; Expected date: 04/12/2023  -     EKG 12-lead; Future  -     Echo; Future      Insomnia:   *Failed Melatonin   -     traZODone (DESYREL) 50 MG tablet; Take 1 tablet (50 mg total) by mouth nightly as needed for Insomnia.  Dispense: 30 tablet; Refill: 0      *Perform Annual PE today and will follow up with one of our  Physician Providers to be considered est'd in medical care with practice site.  Future Appointments   Date Time Provider Department Center   4/13/2023  7:15 AM LAB, APPOINTMENT [facility] LAB VNP [facility]   4/13/2023 11:30 AM EKG, APPT [facility] EKG [facility]   4/17/2023  7:30 AM CV [facility] ECHO/EKG [facility]   5/5/2023  4:00 PM Giuliano Crockett MD [facility] PC[facility]   5/16/2023  7:15 AM [id number]        Medication List            Accurate as of April 12, 2023 12:21 PM. If you have any questions, ask your nurse or doctor.                START taking these medications      traZODone 50 MG tablet  Commonly known as: DESYREL  Take 1 tablet (50 mg total) by mouth nightly as needed for Insomnia.  Started by: TAYLER Lizama            CONTINUE taking these medications      FLINTSTONES COMPLETE (IRON) Chew  Generic drug: pediatric multivit-iron-min     losartan 50 MG tablet  Commonly known as: COZAAR  Take 1.5 tablets (75 mg total) by mouth once daily.               Where to Get Your Medications        These medications were sent to  [id number] - [place], [place] - [address]  [address], [facility] [address]      Phone: [phone]   losartan 50 MG tablet  traZODone 50 MG tablet         Signing Physician:  TAYLER Lizama

## 2023-04-12 ENCOUNTER — OFFICE VISIT (OUTPATIENT)
Dept: INTERNAL MEDICINE | Facility: CLINIC | Age: 53
End: 2023-04-12
Payer: COMMERCIAL

## 2023-04-12 VITALS
BODY MASS INDEX: 43.4 KG/M2 | HEART RATE: 58 BPM | HEIGHT: 69 IN | WEIGHT: 293 LBS | SYSTOLIC BLOOD PRESSURE: 142 MMHG | OXYGEN SATURATION: 100 % | DIASTOLIC BLOOD PRESSURE: 84 MMHG

## 2023-04-12 DIAGNOSIS — R60.0 FLUID RETENTION IN LEGS: ICD-10-CM

## 2023-04-12 DIAGNOSIS — I10 ESSENTIAL HYPERTENSION: ICD-10-CM

## 2023-04-12 DIAGNOSIS — Z00.00 ANNUAL PHYSICAL EXAM: Primary | ICD-10-CM

## 2023-04-12 DIAGNOSIS — G47.00 INSOMNIA, UNSPECIFIED TYPE: ICD-10-CM

## 2023-04-12 PROCEDURE — 99999 PR PBB SHADOW E&M-EST. PATIENT-LVL IV: CPT | Mod: PBBFAC,,, | Performed by: NURSE PRACTITIONER

## 2023-04-12 PROCEDURE — 3008F PR BODY MASS INDEX (BMI) DOCUMENTED: ICD-10-PCS | Mod: CPTII,S$GLB,, | Performed by: NURSE PRACTITIONER

## 2023-04-12 PROCEDURE — 99396 PREV VISIT EST AGE 40-64: CPT | Mod: S$GLB,,, | Performed by: NURSE PRACTITIONER

## 2023-04-12 PROCEDURE — 3079F PR MOST RECENT DIASTOLIC BLOOD PRESSURE 80-89 MM HG: ICD-10-PCS | Mod: CPTII,S$GLB,, | Performed by: NURSE PRACTITIONER

## 2023-04-12 PROCEDURE — 3079F DIAST BP 80-89 MM HG: CPT | Mod: CPTII,S$GLB,, | Performed by: NURSE PRACTITIONER

## 2023-04-12 PROCEDURE — 3077F SYST BP >= 140 MM HG: CPT | Mod: CPTII,S$GLB,, | Performed by: NURSE PRACTITIONER

## 2023-04-12 PROCEDURE — 3008F BODY MASS INDEX DOCD: CPT | Mod: CPTII,S$GLB,, | Performed by: NURSE PRACTITIONER

## 2023-04-12 PROCEDURE — 99396 PR PREVENTIVE VISIT,EST,40-64: ICD-10-PCS | Mod: S$GLB,,, | Performed by: NURSE PRACTITIONER

## 2023-04-12 PROCEDURE — 1159F MED LIST DOCD IN RCRD: CPT | Mod: CPTII,S$GLB,, | Performed by: NURSE PRACTITIONER

## 2023-04-12 PROCEDURE — 99999 PR PBB SHADOW E&M-EST. PATIENT-LVL IV: ICD-10-PCS | Mod: PBBFAC,,, | Performed by: NURSE PRACTITIONER

## 2023-04-12 PROCEDURE — 3077F PR MOST RECENT SYSTOLIC BLOOD PRESSURE >= 140 MM HG: ICD-10-PCS | Mod: CPTII,S$GLB,, | Performed by: NURSE PRACTITIONER

## 2023-04-12 PROCEDURE — 1159F PR MEDICATION LIST DOCUMENTED IN MEDICAL RECORD: ICD-10-PCS | Mod: CPTII,S$GLB,, | Performed by: NURSE PRACTITIONER

## 2023-04-12 RX ORDER — TRAZODONE HYDROCHLORIDE 50 MG/1
50 TABLET ORAL NIGHTLY PRN
Qty: 30 TABLET | Refills: 0 | Status: SHIPPED | OUTPATIENT
Start: 2023-04-12 | End: 2023-05-04

## 2023-04-12 RX ORDER — LOSARTAN POTASSIUM 50 MG/1
75 TABLET ORAL DAILY
Qty: 135 TABLET | Refills: 3 | Status: SHIPPED | OUTPATIENT
Start: 2023-04-12

## 2023-04-13 ENCOUNTER — PATIENT MESSAGE (OUTPATIENT)
Dept: INTERNAL MEDICINE | Facility: CLINIC | Age: 53
End: 2023-04-13
Payer: COMMERCIAL

## 2023-04-13 ENCOUNTER — LAB VISIT (OUTPATIENT)
Dept: LAB | Facility: HOSPITAL | Age: 53
End: 2023-04-13
Payer: COMMERCIAL

## 2023-04-13 ENCOUNTER — TELEPHONE (OUTPATIENT)
Dept: INTERNAL MEDICINE | Facility: CLINIC | Age: 53
End: 2023-04-13
Payer: COMMERCIAL

## 2023-04-13 ENCOUNTER — HOSPITAL ENCOUNTER (OUTPATIENT)
Dept: CARDIOLOGY | Facility: CLINIC | Age: 53
Discharge: HOME | End: 2023-04-13
Payer: COMMERCIAL

## 2023-04-13 DIAGNOSIS — R60.0 FLUID RETENTION IN LEGS: ICD-10-CM

## 2023-04-13 DIAGNOSIS — Z00.00 ANNUAL PHYSICAL EXAM: ICD-10-CM

## 2023-04-13 DIAGNOSIS — I10 ESSENTIAL HYPERTENSION: ICD-10-CM

## 2023-04-13 LAB
25(OH)D3+25(OH)D2 SERPL-MCNC: 34 NG/ML (ref 30–96)
ALBUMIN SERPL BCP-MCNC: 3.6 G/DL (ref 3.5–5.2)
ALP SERPL-CCNC: 89 U/L (ref 55–135)
ALT SERPL W/O P-5'-P-CCNC: 16 U/L (ref 10–44)
ANION GAP SERPL CALC-SCNC: 11 MMOL/L (ref 8–16)
AST SERPL-CCNC: 22 U/L (ref 10–40)
BASOPHILS # BLD AUTO: 0.02 K/UL (ref 0–0.2)
BASOPHILS NFR BLD: 0.5 % (ref 0–1.9)
BILIRUB SERPL-MCNC: 0.7 MG/DL (ref 0.1–1)
BNP SERPL-MCNC: 21 PG/ML (ref 0–99)
BUN SERPL-MCNC: 10 MG/DL (ref 6–20)
CALCIUM SERPL-MCNC: 9.5 MG/DL (ref 8.7–10.5)
CHLORIDE SERPL-SCNC: 105 MMOL/L (ref 95–110)
CHOLEST SERPL-MCNC: 210 MG/DL (ref 120–199)
CHOLEST/HDLC SERPL: 3.4 {RATIO} (ref 2–5)
CO2 SERPL-SCNC: 26 MMOL/L (ref 23–29)
CREAT SERPL-MCNC: 0.8 MG/DL (ref 0.5–1.4)
DIFFERENTIAL METHOD: ABNORMAL
EOSINOPHIL # BLD AUTO: 0.2 K/UL (ref 0–0.5)
EOSINOPHIL NFR BLD: 3.7 % (ref 0–8)
ERYTHROCYTE [DISTWIDTH] IN BLOOD BY AUTOMATED COUNT: 13.1 % (ref 11.5–14.5)
EST. GFR  (NO RACE VARIABLE): >60 ML/MIN/1.73 M^2
ESTIMATED AVG GLUCOSE: 111 MG/DL (ref 68–131)
GLUCOSE SERPL-MCNC: 92 MG/DL (ref 70–110)
HBA1C MFR BLD: 5.5 % (ref 4–5.6)
HCT VFR BLD AUTO: 41.8 % (ref 37–48.5)
HCV AB SERPL QL IA: NORMAL
HDLC SERPL-MCNC: 62 MG/DL (ref 40–75)
HDLC SERPL: 29.5 % (ref 20–50)
HGB BLD-MCNC: 13.2 G/DL (ref 12–16)
IMM GRANULOCYTES # BLD AUTO: 0.01 K/UL (ref 0–0.04)
IMM GRANULOCYTES NFR BLD AUTO: 0.2 % (ref 0–0.5)
LDLC SERPL CALC-MCNC: 122 MG/DL (ref 63–159)
LYMPHOCYTES # BLD AUTO: 1.6 K/UL (ref 1–4.8)
LYMPHOCYTES NFR BLD: 40.9 % (ref 18–48)
MCH RBC QN AUTO: 27.4 PG (ref 27–31)
MCHC RBC AUTO-ENTMCNC: 31.6 G/DL (ref 32–36)
MCV RBC AUTO: 87 FL (ref 82–98)
MONOCYTES # BLD AUTO: 0.4 K/UL (ref 0.3–1)
MONOCYTES NFR BLD: 9.7 % (ref 4–15)
NEUTROPHILS # BLD AUTO: 1.8 K/UL (ref 1.8–7.7)
NEUTROPHILS NFR BLD: 45 % (ref 38–73)
NONHDLC SERPL-MCNC: 148 MG/DL
NRBC BLD-RTO: 0 /100 WBC
PLATELET # BLD AUTO: 296 K/UL (ref 150–450)
PMV BLD AUTO: 9.1 FL (ref 9.2–12.9)
POTASSIUM SERPL-SCNC: 3.9 MMOL/L (ref 3.5–5.1)
PROT SERPL-MCNC: 7.5 G/DL (ref 6–8.4)
RBC # BLD AUTO: 4.82 M/UL (ref 4–5.4)
SODIUM SERPL-SCNC: 142 MMOL/L (ref 136–145)
TRIGL SERPL-MCNC: 130 MG/DL (ref 30–150)
TSH SERPL DL<=0.005 MIU/L-ACNC: 1.23 UIU/ML (ref 0.4–4)
WBC # BLD AUTO: 4.01 K/UL (ref 3.9–12.7)

## 2023-04-13 PROCEDURE — 93000 ELECTROCARDIOGRAM COMPLETE: CPT | Mod: S$GLB,ICN,, | Performed by: INTERNAL MEDICINE

## 2023-04-13 PROCEDURE — 93000 EKG 12-LEAD: ICD-10-PCS | Mod: S$GLB,ICN,, | Performed by: INTERNAL MEDICINE

## 2023-04-13 PROCEDURE — 82306 VITAMIN D 25 HYDROXY: CPT | Performed by: NURSE PRACTITIONER

## 2023-04-13 PROCEDURE — 83880 ASSAY OF NATRIURETIC PEPTIDE: CPT | Performed by: NURSE PRACTITIONER

## 2023-04-13 PROCEDURE — 84443 ASSAY THYROID STIM HORMONE: CPT | Performed by: NURSE PRACTITIONER

## 2023-04-13 PROCEDURE — 36415 COLL VENOUS BLD VENIPUNCTURE: CPT | Performed by: NURSE PRACTITIONER

## 2023-04-13 PROCEDURE — 80053 COMPREHEN METABOLIC PANEL: CPT | Performed by: NURSE PRACTITIONER

## 2023-04-13 PROCEDURE — 80061 LIPID PANEL: CPT | Performed by: NURSE PRACTITIONER

## 2023-04-13 PROCEDURE — 83036 HEMOGLOBIN GLYCOSYLATED A1C: CPT | Performed by: NURSE PRACTITIONER

## 2023-04-13 PROCEDURE — 85025 COMPLETE CBC W/AUTO DIFF WBC: CPT | Performed by: NURSE PRACTITIONER

## 2023-04-13 PROCEDURE — 86803 HEPATITIS C AB TEST: CPT | Performed by: NURSE PRACTITIONER

## 2023-04-13 RX ORDER — FUROSEMIDE 20 MG/1
TABLET ORAL
Qty: 30 TABLET | Refills: 0 | Status: SHIPPED | OUTPATIENT
Start: 2023-04-13 | End: 2023-05-11

## 2023-05-04 RX ORDER — TRAZODONE HYDROCHLORIDE 50 MG/1
TABLET ORAL
Qty: 30 TABLET | Refills: 0 | Status: SHIPPED | OUTPATIENT
Start: 2023-05-04 | End: 2023-05-31

## 2023-05-04 NOTE — TELEPHONE ENCOUNTER
Refill Routing Note   Medication(s) are not appropriate for processing by [facility] for the following reason(s):      Responsible provider unclear    [facility] action(s):  Route            Appointments  past 12m or future 3m with PCP    Date Provider   Last Visit   4/12/2023 TAYLER Roe   Next Visit   Visit date not found TAYLER Roe   ED visits in past 90 days: 0        Note composed:3:47 PM 05/04/2023

## 2023-05-05 ENCOUNTER — OFFICE VISIT (OUTPATIENT)
Dept: INTERNAL MEDICINE | Facility: CLINIC | Age: 53
End: 2023-05-05
Payer: COMMERCIAL

## 2023-05-05 VITALS
BODY MASS INDEX: 43.4 KG/M2 | TEMPERATURE: 98 F | HEART RATE: 70 BPM | OXYGEN SATURATION: 99 % | HEIGHT: 69 IN | DIASTOLIC BLOOD PRESSURE: 80 MMHG | SYSTOLIC BLOOD PRESSURE: 138 MMHG | WEIGHT: 293 LBS

## 2023-05-05 DIAGNOSIS — I10 ESSENTIAL HYPERTENSION: ICD-10-CM

## 2023-05-05 DIAGNOSIS — Z12.11 SCREENING FOR COLON CANCER: ICD-10-CM

## 2023-05-05 DIAGNOSIS — E11.9 TYPE 2 DIABETES MELLITUS WITHOUT COMPLICATION, WITHOUT LONG-TERM CURRENT USE OF INSULIN: ICD-10-CM

## 2023-05-05 DIAGNOSIS — F33.41 RECURRENT MAJOR DEPRESSIVE DISORDER, IN PARTIAL REMISSION: ICD-10-CM

## 2023-05-05 DIAGNOSIS — E66.01 MORBID OBESITY: ICD-10-CM

## 2023-05-05 DIAGNOSIS — H91.91 DECREASED HEARING OF RIGHT EAR: ICD-10-CM

## 2023-05-05 DIAGNOSIS — R60.0 LOWER EXTREMITY EDEMA: ICD-10-CM

## 2023-05-05 DIAGNOSIS — Z12.4 SCREENING FOR CERVICAL CANCER: ICD-10-CM

## 2023-05-05 DIAGNOSIS — Z76.89 ESTABLISHING CARE WITH NEW DOCTOR, ENCOUNTER FOR: Primary | ICD-10-CM

## 2023-05-05 PROBLEM — Z98.84 STATUS POST BARIATRIC SURGERY: Status: ACTIVE | Noted: 2021-05-07

## 2023-05-05 PROBLEM — T50.2X5A DIURETIC-INDUCED HYPOKALEMIA: Status: ACTIVE | Noted: 2021-05-07

## 2023-05-05 PROBLEM — E87.6 DIURETIC-INDUCED HYPOKALEMIA: Status: ACTIVE | Noted: 2021-05-07

## 2023-05-05 PROBLEM — R00.2 PALPITATIONS: Status: ACTIVE | Noted: 2021-05-07

## 2023-05-05 PROBLEM — I95.1 ORTHOSTATIC HYPOTENSION: Status: ACTIVE | Noted: 2021-05-07

## 2023-05-05 PROCEDURE — 3075F PR MOST RECENT SYSTOLIC BLOOD PRESS GE 130-139MM HG: ICD-10-PCS | Mod: CPTII,S$GLB,, | Performed by: STUDENT IN AN ORGANIZED HEALTH CARE EDUCATION/TRAINING PROGRAM

## 2023-05-05 PROCEDURE — 3044F HG A1C LEVEL LT 7.0%: CPT | Mod: CPTII,S$GLB,, | Performed by: STUDENT IN AN ORGANIZED HEALTH CARE EDUCATION/TRAINING PROGRAM

## 2023-05-05 PROCEDURE — 1159F PR MEDICATION LIST DOCUMENTED IN MEDICAL RECORD: ICD-10-PCS | Mod: CPTII,S$GLB,, | Performed by: STUDENT IN AN ORGANIZED HEALTH CARE EDUCATION/TRAINING PROGRAM

## 2023-05-05 PROCEDURE — 4010F ACE/ARB THERAPY RXD/TAKEN: CPT | Mod: CPTII,S$GLB,, | Performed by: STUDENT IN AN ORGANIZED HEALTH CARE EDUCATION/TRAINING PROGRAM

## 2023-05-05 PROCEDURE — 3044F PR MOST RECENT HEMOGLOBIN A1C LEVEL <7.0%: ICD-10-PCS | Mod: CPTII,S$GLB,, | Performed by: STUDENT IN AN ORGANIZED HEALTH CARE EDUCATION/TRAINING PROGRAM

## 2023-05-05 PROCEDURE — 99999 PR PBB SHADOW E&M-EST. PATIENT-LVL V: ICD-10-PCS | Mod: PBBFAC,,, | Performed by: STUDENT IN AN ORGANIZED HEALTH CARE EDUCATION/TRAINING PROGRAM

## 2023-05-05 PROCEDURE — 3008F PR BODY MASS INDEX (BMI) DOCUMENTED: ICD-10-PCS | Mod: CPTII,S$GLB,, | Performed by: STUDENT IN AN ORGANIZED HEALTH CARE EDUCATION/TRAINING PROGRAM

## 2023-05-05 PROCEDURE — 3008F BODY MASS INDEX DOCD: CPT | Mod: CPTII,S$GLB,, | Performed by: STUDENT IN AN ORGANIZED HEALTH CARE EDUCATION/TRAINING PROGRAM

## 2023-05-05 PROCEDURE — 1160F RVW MEDS BY RX/DR IN RCRD: CPT | Mod: CPTII,S$GLB,, | Performed by: STUDENT IN AN ORGANIZED HEALTH CARE EDUCATION/TRAINING PROGRAM

## 2023-05-05 PROCEDURE — 99999 PR PBB SHADOW E&M-EST. PATIENT-LVL V: CPT | Mod: PBBFAC,,, | Performed by: STUDENT IN AN ORGANIZED HEALTH CARE EDUCATION/TRAINING PROGRAM

## 2023-05-05 PROCEDURE — 99214 OFFICE O/P EST MOD 30 MIN: CPT | Mod: S$GLB,,, | Performed by: STUDENT IN AN ORGANIZED HEALTH CARE EDUCATION/TRAINING PROGRAM

## 2023-05-05 PROCEDURE — 4010F PR ACE/ARB THEARPY RXD/TAKEN: ICD-10-PCS | Mod: CPTII,S$GLB,, | Performed by: STUDENT IN AN ORGANIZED HEALTH CARE EDUCATION/TRAINING PROGRAM

## 2023-05-05 PROCEDURE — 3075F SYST BP GE 130 - 139MM HG: CPT | Mod: CPTII,S$GLB,, | Performed by: STUDENT IN AN ORGANIZED HEALTH CARE EDUCATION/TRAINING PROGRAM

## 2023-05-05 PROCEDURE — 3079F PR MOST RECENT DIASTOLIC BLOOD PRESSURE 80-89 MM HG: ICD-10-PCS | Mod: CPTII,S$GLB,, | Performed by: STUDENT IN AN ORGANIZED HEALTH CARE EDUCATION/TRAINING PROGRAM

## 2023-05-05 PROCEDURE — 1159F MED LIST DOCD IN RCRD: CPT | Mod: CPTII,S$GLB,, | Performed by: STUDENT IN AN ORGANIZED HEALTH CARE EDUCATION/TRAINING PROGRAM

## 2023-05-05 PROCEDURE — 3079F DIAST BP 80-89 MM HG: CPT | Mod: CPTII,S$GLB,, | Performed by: STUDENT IN AN ORGANIZED HEALTH CARE EDUCATION/TRAINING PROGRAM

## 2023-05-05 PROCEDURE — 99214 PR OFFICE/OUTPT VISIT, EST, LEVL IV, 30-39 MIN: ICD-10-PCS | Mod: S$GLB,,, | Performed by: STUDENT IN AN ORGANIZED HEALTH CARE EDUCATION/TRAINING PROGRAM

## 2023-05-05 PROCEDURE — 1160F PR REVIEW ALL MEDS BY PRESCRIBER/CLIN PHARMACIST DOCUMENTED: ICD-10-PCS | Mod: CPTII,S$GLB,, | Performed by: STUDENT IN AN ORGANIZED HEALTH CARE EDUCATION/TRAINING PROGRAM

## 2023-05-05 RX ORDER — ATORVASTATIN CALCIUM 10 MG/1
10 TABLET, FILM COATED ORAL DAILY
Qty: 90 TABLET | Refills: 3 | Status: SHIPPED | OUTPATIENT
Start: 2023-05-05 | End: 2023-07-26 | Stop reason: SDUPTHER

## 2023-05-05 NOTE — PATIENT INSTRUCTIONS
Mediterranean style diet:    Eat:  Olive oil, lean meats such as chicken and fish and only small servings of carbohydrates.   Olive oil and vinegar instead of low fat salad dressings.  Cook food in olive oil.  You can pan carpio or saute fish and vegetables instead of boiling or baking.  Unsalted nuts for snacks. Walnuts, cashews, almonds, pecans and pistachios ( not peanuts). Try almond butter or cashew butter on toast or crackers.  Brown bread. You can also dip bread in olive oil and eat it as a snack or appetizer  Seasonal or frozen vegetables and fruits.     Avoid:  Saturated fats and deep fried foods. Also stay away from large servings of starches, sweets, desserts and sugary drinks (both sodas and fruit juices)    Physical Activity Recommendations  Aerobic (or cardio) activity gets your heart rate up and benefits your heart by improving cardiorespiratory fitness. When done at moderate intensity, your heart will beat faster and youll breathe harder than normal, but youll still be able to talk. Think of it as a medium or moderate amount of effort.    It is recommended that you do 30 minutes of moderate-intensity aerobic exercise at least 5 days a week.     Examples of moderate-intensity aerobic activities:    - brisk walking (at least 2.5 miles per hour)  - water aerobics  - dancing (ballroom or social)  - gardening  - tennis (doubles)  - biking slower than 10 miles per hour

## 2023-05-05 NOTE — PROGRESS NOTES
SUBJECTIVE     Chief Complaint   Patient presents with    Follow-up     Est care       HPI  Gabriella Gilbert is a 52 y.o. female with  HTN, T2DM, obesity  that presents for establishment of care.     A1c 5.5% in 4/2023.   Not on medication.   Recent lipid panel with LDL > 100.   On an ARB.     HTN:   Losartan 75 mg daily. BP controlled     Lower extremity edema:   Recent BNP normal. Swelling worse at the end of the day, legs feel tight, sometimes with cramping. Recently started Lasix 20 mg PRN. Improved once starting Lasix. EKG normal.     Patient frustrated about inability to lose weight. Had been working out for 9 months, did not lose weight. Watching carbs. History of gastric sleeve. Reports she snacks frequently. Interested in GLP-1's, but patient's mother with history of thyroid cancer.     Has some muffled hearing in left ear. Has been present for several years. Has had tinnitus in the past bilaterally.     PAST MEDICAL HISTORY:  Past Medical History:   Diagnosis Date    Hypertension        PAST SURGICAL HISTORY:  Past Surgical History:   Procedure Laterality Date    BARIATRIC SURGERY      2018       FAMILY HISTORY:  Family History   Problem Relation Age of Onset    Thyroid cancer Mother     Hypertension Mother     Cataracts Mother     No Known Problems Father     No Known Problems Sister     No Known Problems Brother     No Known Problems Maternal Aunt     No Known Problems Maternal Uncle     No Known Problems Paternal Aunt     No Known Problems Paternal Uncle     Cataracts Maternal Grandmother     No Known Problems Maternal Grandfather     No Known Problems Paternal Grandmother     No Known Problems Paternal Grandfather        ALLERGIES AND MEDICATIONS: updated and reviewed.  Review of patient's allergies indicates:  No Known Allergies  Current Outpatient Medications   Medication Sig Dispense Refill    furosemide (LASIX) 20 MG tablet Take 1 tab by mouth daily x 3 days, then daily as needed for leg swelling. 30  tablet 0    losartan (COZAAR) 50 MG tablet Take 1.5 tablets (75 mg total) by mouth once daily. 135 tablet 3    pediatric multivit-iron-min (FLINTSTONES COMPLETE, IRON,) Chew Take 2 tablets by mouth once daily.      traZODone (DESYREL) 50 MG tablet TAKE 1 TABLET BY MOUTH NIGHTLY AS NEEDED FOR INSOMNIA. 30 tablet 0     No current facility-administered medications for this visit.       ROS  Review of Systems   Constitutional:  Negative for activity change, chills and fever.   HENT:  Negative for congestion and hearing loss.    Eyes:  Negative for pain and visual disturbance.   Respiratory:  Negative for cough and shortness of breath.    Cardiovascular:  Negative for chest pain and palpitations.   Gastrointestinal:  Negative for abdominal pain, constipation, diarrhea, nausea and vomiting.   Endocrine: Negative.    Genitourinary: Negative.    Musculoskeletal:  Negative for arthralgias and myalgias.   Skin: Negative.    Allergic/Immunologic: Negative.    Neurological:  Negative for dizziness, light-headedness and headaches.   Hematological: Negative.        OBJECTIVE     Physical Exam  Vitals:    05/05/23 1606   BP: 138/80   Pulse: 70   Temp: 98.2 °F (36.8 °C)    Body mass index is 44.83 kg/m².            Physical Exam  Vitals reviewed.   Constitutional:       General: She is not in acute distress.     Appearance: Normal appearance. She is obese.   HENT:      Head: Normocephalic and atraumatic.      Mouth/Throat:      Mouth: Mucous membranes are moist.      Pharynx: Oropharynx is clear.   Eyes:      Extraocular Movements: Extraocular movements intact.      Conjunctiva/sclera: Conjunctivae normal.      Pupils: Pupils are equal, round, and reactive to light.   Cardiovascular:      Rate and Rhythm: Normal rate and regular rhythm.      Pulses: Normal pulses.      Heart sounds: Normal heart sounds.   Pulmonary:      Effort: Pulmonary effort is normal.      Breath sounds: Normal breath sounds.   Abdominal:      General: Bowel  sounds are normal. There is no distension.      Palpations: Abdomen is soft. There is no mass.      Tenderness: There is no abdominal tenderness. There is no guarding.   Musculoskeletal:         General: Normal range of motion.      Cervical back: Normal range of motion and neck supple. No rigidity or tenderness.      Right lower leg: No edema.      Left lower leg: No edema.   Lymphadenopathy:      Cervical: No cervical adenopathy.   Skin:     General: Skin is warm and dry.   Neurological:      General: No focal deficit present.      Mental Status: She is alert.   Psychiatric:         Mood and Affect: Mood normal.         Behavior: Behavior normal.         Health Maintenance         Date Due Completion Date    Cervical Cancer Screening Never done ---    TETANUS VACCINE Never done ---    Mammogram Never done ---    Colorectal Cancer Screening Never done ---    Shingles Vaccine (1 of 2) Never done ---    COVID-19 Vaccine (4 - Booster for Pfizer series) 12/28/2021 11/2/2021    HIV Screening 04/12/2029 (Originally 6/9/1985) ---    Hemoglobin A1c (Diabetic Prevention Screening) 04/13/2026 4/13/2023    Lipid Panel 04/13/2028 4/13/2023              ASSESSMENT     52 y.o. female with     1. Establishing care with new doctor, encounter for    2. Lower extremity edema    3. Type 2 diabetes mellitus without complication, without long-term current use of insulin    4. Morbid obesity    5. Recurrent major depressive disorder, in partial remission    6. Essential hypertension    7. Screening for colon cancer    8. Screening for cervical cancer    9. Decreased hearing of right ear        PLAN:     1. Establishing care with new doctor, encounter for  - Prior notes/labs/imaging reviewed.  - This is a new patient to me but established to [facility].    2. Lower extremity edema  - Improved with PRN Lasix.   - Suspect this is due to CVI given normal BNP, TSH, CBC, CMP and lack of other CHF symptoms, normal EKG.   - Watch sodium in diet,  elevate feet above level of heart PRN, consider compression stockings. Encouraged moving around more at work.     3. Type 2 diabetes mellitus without complication, without long-term current use of insulin  - Controlled off of medications. Will need to start statin as LDL is not at goal.   - atorvastatin (LIPITOR) 10 MG tablet; Take 1 tablet (10 mg total) by mouth once daily.  Dispense: 90 tablet; Refill: 3  - Ambulatory referral/consult to Health  (diabetes empowerment); Future    4. Morbid obesity  - Patient not a candidate for GLP-1 given 1st degree relative with thyroid cancer. Counseled on Mediterranean Diet, physical activity recs. Try to limit snacking.   - Ambulatory referral/consult to Health  (diabetes empowerment); Future    5. Recurrent major depressive disorder, in partial remission  - Stable off of medication.     6. Essential hypertension  - Controlled on current regimen. Continue.     7. Screening for colon cancer  - Reports colonoscopy done while living in [place] in 2017, was told to repeat in 10 years. Will look for name of facility where she had procedure done.     8. Screening for cervical cancer  - Ambulatory referral/consult to Gynecology; Future    9. Decreased hearing of right ear  - Ambulatory referral/consult to Audiology; Future        RTC in 6 months.      Giuliano Crockett MD  Family Medicine  [facility]  05/05/2023    This document was created using voice recognition software (M*Modal Fluency Direct). Although it may be edited, this document may contain errors related to incorrect recognition of the spoken word. Please call the physician if clarification is needed.           No follow-ups on file.

## 2023-05-11 RX ORDER — FUROSEMIDE 20 MG/1
TABLET ORAL
Qty: 30 TABLET | Refills: 0 | Status: SHIPPED | OUTPATIENT
Start: 2023-05-11

## 2023-05-11 NOTE — TELEPHONE ENCOUNTER
Refill Routing Note   Medication(s) are not appropriate for processing by [facility] for the following reason(s):      Non-participating provider    [facility] action(s):  Route None identified          Appointments  past 12m or future 3m with PCP    Date Provider   Last Visit   4/12/2023 TAYLER Roe   Next Visit   Visit date not found TAYLER Roe   ED visits in past 90 days: 0        Note composed:12:42 PM 05/11/2023

## 2023-05-11 NOTE — TELEPHONE ENCOUNTER
Please see the attached refill request.  *Will defer the refill to you, Dr. Crockett.     Reji,   Becka Tierney

## 2023-05-16 ENCOUNTER — HOSPITAL ENCOUNTER (OUTPATIENT)
Dept: RADIOLOGY | Facility: HOSPITAL | Age: 53
Discharge: HOME | End: 2023-05-16
Attending: NURSE PRACTITIONER
Payer: COMMERCIAL

## 2023-05-16 ENCOUNTER — PATIENT MESSAGE (OUTPATIENT)
Dept: INTERNAL MEDICINE | Facility: CLINIC | Age: 53
End: 2023-05-16
Payer: COMMERCIAL

## 2023-05-16 VITALS — BODY MASS INDEX: 42.68 KG/M2 | WEIGHT: 289 LBS

## 2023-05-16 DIAGNOSIS — I10 ESSENTIAL HYPERTENSION: ICD-10-CM

## 2023-05-16 DIAGNOSIS — R60.0 FLUID RETENTION IN LEGS: ICD-10-CM

## 2023-05-16 DIAGNOSIS — Z00.00 ANNUAL PHYSICAL EXAM: ICD-10-CM

## 2023-05-16 PROCEDURE — 77067 SCR MAMMO BI INCL CAD: CPT | Mod: 26,,, | Performed by: RADIOLOGY

## 2023-05-16 PROCEDURE — 77063 BREAST TOMOSYNTHESIS BI: CPT | Mod: 26,,, | Performed by: RADIOLOGY

## 2023-05-16 PROCEDURE — 77067 SCR MAMMO BI INCL CAD: CPT | Mod: TC

## 2023-05-16 PROCEDURE — 77067 MAMMO DIGITAL SCREENING BILAT WITH TOMO: ICD-10-PCS | Mod: 26,,, | Performed by: RADIOLOGY

## 2023-05-16 PROCEDURE — 77063 MAMMO DIGITAL SCREENING BILAT WITH TOMO: ICD-10-PCS | Mod: 26,,, | Performed by: RADIOLOGY

## 2023-05-19 ENCOUNTER — PATIENT MESSAGE (OUTPATIENT)
Dept: INTERNAL MEDICINE | Facility: CLINIC | Age: 53
End: 2023-05-19
Payer: COMMERCIAL

## 2023-05-24 ENCOUNTER — PATIENT MESSAGE (OUTPATIENT)
Dept: INTERNAL MEDICINE | Facility: CLINIC | Age: 53
End: 2023-05-24
Payer: COMMERCIAL

## 2023-05-24 DIAGNOSIS — E11.9 TYPE 2 DIABETES MELLITUS WITHOUT COMPLICATION, WITHOUT LONG-TERM CURRENT USE OF INSULIN: ICD-10-CM

## 2023-05-24 DIAGNOSIS — E66.01 MORBID OBESITY: Primary | ICD-10-CM

## 2023-05-25 ENCOUNTER — PATIENT MESSAGE (OUTPATIENT)
Dept: INTERNAL MEDICINE | Facility: CLINIC | Age: 53
End: 2023-05-25
Payer: COMMERCIAL

## 2023-05-25 RX ORDER — METFORMIN HYDROCHLORIDE 500 MG/1
500 TABLET ORAL 2 TIMES DAILY WITH MEALS
Qty: 180 TABLET | Refills: 3 | Status: SHIPPED | OUTPATIENT
Start: 2023-05-25 | End: 2023-11-06

## 2023-05-29 NOTE — TELEPHONE ENCOUNTER
Please see the attached refill request.  *Will respectfully defer refills to you at this time.     Reji,   `SDJ

## 2023-05-29 NOTE — TELEPHONE ENCOUNTER
Refill Routing Note   Medication(s) are not appropriate for processing by [facility] for the following reason(s):      Non-participating provider  Responsible provider unclear    [facility] action(s):  Route Care Due:  None identified          Appointments  past 12m or future 3m with PCP    Date Provider   Last Visit   4/12/2023 TAYLER Roe   Next Visit   Visit date not found TAYLER Roe   ED visits in past 90 days: 0        Note composed:8:49 AM 05/29/2023

## 2023-05-31 RX ORDER — TRAZODONE HYDROCHLORIDE 50 MG/1
TABLET ORAL
Qty: 30 TABLET | Refills: 11 | Status: SHIPPED | OUTPATIENT
Start: 2023-05-31

## 2023-06-14 ENCOUNTER — OFFICE VISIT (OUTPATIENT)
Dept: OBSTETRICS AND GYNECOLOGY | Facility: CLINIC | Age: 53
End: 2023-06-14
Payer: COMMERCIAL

## 2023-06-14 VITALS
BODY MASS INDEX: 43.4 KG/M2 | DIASTOLIC BLOOD PRESSURE: 88 MMHG | WEIGHT: 293 LBS | HEIGHT: 69 IN | SYSTOLIC BLOOD PRESSURE: 138 MMHG

## 2023-06-14 DIAGNOSIS — Z01.419 WELL WOMAN EXAM: Primary | ICD-10-CM

## 2023-06-14 DIAGNOSIS — Z12.4 SCREENING FOR CERVICAL CANCER: ICD-10-CM

## 2023-06-14 PROCEDURE — 3079F PR MOST RECENT DIASTOLIC BLOOD PRESSURE 80-89 MM HG: ICD-10-PCS | Mod: CPTII,S$GLB,, | Performed by: STUDENT IN AN ORGANIZED HEALTH CARE EDUCATION/TRAINING PROGRAM

## 2023-06-14 PROCEDURE — 3075F SYST BP GE 130 - 139MM HG: CPT | Mod: CPTII,S$GLB,, | Performed by: STUDENT IN AN ORGANIZED HEALTH CARE EDUCATION/TRAINING PROGRAM

## 2023-06-14 PROCEDURE — 1160F PR REVIEW ALL MEDS BY PRESCRIBER/CLIN PHARMACIST DOCUMENTED: ICD-10-PCS | Mod: CPTII,S$GLB,, | Performed by: STUDENT IN AN ORGANIZED HEALTH CARE EDUCATION/TRAINING PROGRAM

## 2023-06-14 PROCEDURE — 1159F MED LIST DOCD IN RCRD: CPT | Mod: CPTII,S$GLB,, | Performed by: STUDENT IN AN ORGANIZED HEALTH CARE EDUCATION/TRAINING PROGRAM

## 2023-06-14 PROCEDURE — 3044F PR MOST RECENT HEMOGLOBIN A1C LEVEL <7.0%: ICD-10-PCS | Mod: CPTII,S$GLB,, | Performed by: STUDENT IN AN ORGANIZED HEALTH CARE EDUCATION/TRAINING PROGRAM

## 2023-06-14 PROCEDURE — 99386 PREV VISIT NEW AGE 40-64: CPT | Mod: S$GLB,,, | Performed by: STUDENT IN AN ORGANIZED HEALTH CARE EDUCATION/TRAINING PROGRAM

## 2023-06-14 PROCEDURE — 4010F ACE/ARB THERAPY RXD/TAKEN: CPT | Mod: CPTII,S$GLB,, | Performed by: STUDENT IN AN ORGANIZED HEALTH CARE EDUCATION/TRAINING PROGRAM

## 2023-06-14 PROCEDURE — 87624 HPV HI-RISK TYP POOLED RSLT: CPT | Performed by: STUDENT IN AN ORGANIZED HEALTH CARE EDUCATION/TRAINING PROGRAM

## 2023-06-14 PROCEDURE — 1160F RVW MEDS BY RX/DR IN RCRD: CPT | Mod: CPTII,S$GLB,, | Performed by: STUDENT IN AN ORGANIZED HEALTH CARE EDUCATION/TRAINING PROGRAM

## 2023-06-14 PROCEDURE — 99999 PR PBB SHADOW E&M-EST. PATIENT-LVL III: CPT | Mod: PBBFAC,,, | Performed by: STUDENT IN AN ORGANIZED HEALTH CARE EDUCATION/TRAINING PROGRAM

## 2023-06-14 PROCEDURE — 99999 PR PBB SHADOW E&M-EST. PATIENT-LVL III: ICD-10-PCS | Mod: PBBFAC,,, | Performed by: STUDENT IN AN ORGANIZED HEALTH CARE EDUCATION/TRAINING PROGRAM

## 2023-06-14 PROCEDURE — 99386 PR PREVENTIVE VISIT,NEW,40-64: ICD-10-PCS | Mod: S$GLB,,, | Performed by: STUDENT IN AN ORGANIZED HEALTH CARE EDUCATION/TRAINING PROGRAM

## 2023-06-14 PROCEDURE — 4010F PR ACE/ARB THEARPY RXD/TAKEN: ICD-10-PCS | Mod: CPTII,S$GLB,, | Performed by: STUDENT IN AN ORGANIZED HEALTH CARE EDUCATION/TRAINING PROGRAM

## 2023-06-14 PROCEDURE — 3044F HG A1C LEVEL LT 7.0%: CPT | Mod: CPTII,S$GLB,, | Performed by: STUDENT IN AN ORGANIZED HEALTH CARE EDUCATION/TRAINING PROGRAM

## 2023-06-14 PROCEDURE — 3008F BODY MASS INDEX DOCD: CPT | Mod: CPTII,S$GLB,, | Performed by: STUDENT IN AN ORGANIZED HEALTH CARE EDUCATION/TRAINING PROGRAM

## 2023-06-14 PROCEDURE — 1159F PR MEDICATION LIST DOCUMENTED IN MEDICAL RECORD: ICD-10-PCS | Mod: CPTII,S$GLB,, | Performed by: STUDENT IN AN ORGANIZED HEALTH CARE EDUCATION/TRAINING PROGRAM

## 2023-06-14 PROCEDURE — 88175 CYTOPATH C/V AUTO FLUID REDO: CPT | Performed by: STUDENT IN AN ORGANIZED HEALTH CARE EDUCATION/TRAINING PROGRAM

## 2023-06-14 PROCEDURE — 3079F DIAST BP 80-89 MM HG: CPT | Mod: CPTII,S$GLB,, | Performed by: STUDENT IN AN ORGANIZED HEALTH CARE EDUCATION/TRAINING PROGRAM

## 2023-06-14 PROCEDURE — 3008F PR BODY MASS INDEX (BMI) DOCUMENTED: ICD-10-PCS | Mod: CPTII,S$GLB,, | Performed by: STUDENT IN AN ORGANIZED HEALTH CARE EDUCATION/TRAINING PROGRAM

## 2023-06-14 PROCEDURE — 3075F PR MOST RECENT SYSTOLIC BLOOD PRESS GE 130-139MM HG: ICD-10-PCS | Mod: CPTII,S$GLB,, | Performed by: STUDENT IN AN ORGANIZED HEALTH CARE EDUCATION/TRAINING PROGRAM

## 2023-06-14 NOTE — PROGRESS NOTES
History & Physical  Gynecology      SUBJECTIVE:     Chief Complaint: Well Woman       History of Present Illness:  Gabriella Gilbert is a 53 y.o.  who presents for annual physical exam and to establish care.    Her LMP was over a year ago. States cycles were never regular prior to this. No hot flashes, night sweats. Some insomnia, takes trazodone. Some vaginal dryness.  She is not currently sexually active, declines STI screen.  No vaginal discharge, odor, or itching.    She reports possible history of abnormal pap smear but no excisional procedures.   No pap on file.    She has no family history of breast or ovarian cancer.   Last mammogram was 2023 and was benign.   She has no family history of colon cancer.   Has not had colonoscopy but cologard ordered by PCP.    She does not and has never used tobacco products.  She drinks alcohol rarely.   She does not use recreational or other drugs.   She does exercise on a regular basis and reports her diet is healthy. She expresses frustration regarding weight gain of 20 lb in the last year despite continuing to follow the bariatric diet.    Review of patient's allergies indicates:  No Known Allergies    Past Medical History:   Diagnosis Date    Hypertension     Type 2 diabetes mellitus without complication, without long-term current use of insulin 1/15/2009     Past Surgical History:   Procedure Laterality Date    BARIATRIC SURGERY           OB History          0    Para   0    Term   0            AB        Living             SAB        IAB        Ectopic        Multiple        Live Births                   Family History   Problem Relation Age of Onset    No Known Problems Paternal Grandfather     No Known Problems Paternal Grandmother     Cataracts Maternal Grandmother     No Known Problems Maternal Grandfather     No Known Problems Father     Thyroid cancer Mother     Hypertension Mother     Cataracts Mother     No Known Problems Brother     No  Known Problems Sister     No Known Problems Maternal Aunt     No Known Problems Maternal Uncle     No Known Problems Paternal Aunt     No Known Problems Paternal Uncle     Breast cancer Neg Hx     Ovarian cancer Neg Hx     Colon cancer Neg Hx      Social History     Tobacco Use    Smoking status: Never    Smokeless tobacco: Never   Substance Use Topics    Alcohol use: Yes     Comment: rarely    Drug use: Never       Current Outpatient Medications   Medication Sig    atorvastatin (LIPITOR) 10 MG tablet Take 1 tablet (10 mg total) by mouth once daily.    furosemide (LASIX) 20 MG tablet TAKE 1 TAB BY MOUTH DAILY X 3 DAYS, THEN DAILY AS NEEDED FOR LEG SWELLING.    losartan (COZAAR) 50 MG tablet Take 1.5 tablets (75 mg total) by mouth once daily.    metFORMIN (GLUCOPHAGE) 500 MG tablet Take 1 tablet (500 mg total) by mouth 2 (two) times daily with meals.    pediatric multivit-iron-min (FLINTSTONES COMPLETE, IRON,) Chew Take 2 tablets by mouth once daily.    traZODone (DESYREL) 50 MG tablet TAKE 1 TABLET BY MOUTH NIGHTLY AS NEEDED FOR INSOMNIA.     No current facility-administered medications for this visit.         Review of Systems:  Review of Systems   Constitutional:  Negative for chills and fever.   Respiratory:  Negative for shortness of breath.    Cardiovascular:  Negative for chest pain.   Gastrointestinal:  Negative for abdominal pain, constipation, diarrhea, nausea and vomiting.   Endocrine: Negative for hot flashes.   Genitourinary:  Positive for vaginal dryness. Negative for dysuria, pelvic pain, vaginal bleeding, vaginal discharge and vaginal odor.   Integumentary:  Negative for breast mass, nipple discharge and breast skin changes.   Neurological:  Negative for headaches.   Breast: Negative for mass, nipple discharge and skin changes     OBJECTIVE:     Physical Exam:  Physical Exam  Vitals reviewed.   Constitutional:       General: She is not in acute distress.     Appearance: Normal appearance. She is  well-developed. She is not ill-appearing or toxic-appearing.   HENT:      Head: Normocephalic and atraumatic.      Nose: Nose normal.   Eyes:      Extraocular Movements: Extraocular movements intact.      Conjunctiva/sclera: Conjunctivae normal.   Cardiovascular:      Rate and Rhythm: Normal rate.   Pulmonary:      Effort: Pulmonary effort is normal. No respiratory distress.   Abdominal:      Palpations: Abdomen is soft. There is no mass.      Tenderness: There is no abdominal tenderness. There is no guarding or rebound.   Genitourinary:     Vagina: Normal. No vaginal discharge or bleeding.      Cervix: No cervical motion tenderness.      Uterus: Not enlarged and not tender.       Adnexa:         Right: No mass, tenderness or fullness.          Left: No mass, tenderness or fullness.     Musculoskeletal:         General: Normal range of motion.      Cervical back: Normal range of motion.   Skin:     General: Skin is warm and dry.   Neurological:      Mental Status: She is alert. Mental status is at baseline.   Psychiatric:         Mood and Affect: Mood normal.         Behavior: Behavior normal.         Thought Content: Thought content normal.         ASSESSMENT:       ICD-10-CM ICD-9-CM    1. Well woman exam  Z01.419 V72.31 Liquid-Based Pap Smear, Screening      HPV High Risk Genotypes, PCR      2. Screening for cervical cancer  Z12.4 V76.2 Ambulatory referral/consult to Gynecology             Plan:      -- Pap and HPV today.  -- MMG up to date, patient declined breast exam today.  -- Colon cancer screening per PCP.  -- Discussed diet and exercise regimen, encouragement provided.   -- RTC in 1 year for annual or sooner PRN.      Heavenly Ascencio MD

## 2023-06-15 ENCOUNTER — PATIENT OUTREACH (OUTPATIENT)
Dept: ADMINISTRATIVE | Facility: HOSPITAL | Age: 53
End: 2023-06-15
Payer: COMMERCIAL

## 2023-06-15 NOTE — PROGRESS NOTES
Health Maintenance Due   Topic Date Due    Cervical Cancer Screening  Never done    Colorectal Cancer Screening  Never done    Shingles Vaccine (1 of 2) Never done    COVID-19 Vaccine (4 - Booster for Pfizer series) 12/28/2021     Triggered LINKS and reconciled immunizations. Updated Care Everywhere. Checked for outside lab results in [facility] and [facility]; no results found. Record request sent to Dr. Renetta Sales asking for a copy of pt's most recent colonoscopy results. Chart review completed.

## 2023-06-15 NOTE — LETTER
AUTHORIZATION FOR RELEASE OF   CONFIDENTIAL INFORMATION    Dear Dr. Renetta Sales,    We are seeing Gabriella Gilbert, date of birth 1970, in the clinic at [facility] INTERNAL MEDICINE. Giuliano Crockett MD is the patient's PCP. Gabriella Gilbert has an outstanding lab/procedure at the time we reviewed her chart. In order to help keep her health information updated, she has authorized us to request the following medical record(s):        (  )  MAMMOGRAM                                      ( X )  COLONOSCOPY      (  )  PAP SMEAR                                          (  )  OUTSIDE LAB RESULTS     (  )  DEXA SCAN                                          (  )  EYE EXAM            (  )  FOOT EXAM                                          (  )  ENTIRE RECORD     (  )  OUTSIDE IMMUNIZATIONS                 (  )  _______________         Please fax records to Giuliano Crockett MD, [phone]     If you have any questions, please contact MATEO Green at [phone].           Patient Name: Gabriella Gilbert  : 1970  Patient Phone #: [phone]

## 2023-06-15 NOTE — LETTER
AUTHORIZATION FOR RELEASE OF   CONFIDENTIAL INFORMATION    Dear Dr. Amy Carmen,    We are seeing Gabriella Gilbert, date of birth 1970, in the clinic at [facility] INTER[facility]. Giuliano Crockett MD is the patient's PCP. Gabriella Gilbert has an outstanding lab/procedure at the time we reviewed her chart. In order to help keep her health information updated, she has authorized us to request the following medical record(s):        (  )  MAMMOGRAM                                      (  )  COLONOSCOPY      ( X )  PAP SMEAR                                       (  )  OUTSIDE LAB RESULTS     (  )  DEXA SCAN                                          (  )  EYE EXAM            (  )  FOOT EXAM                                          (  )  ENTIRE RECORD     (  )  OUTSIDE IMMUNIZATIONS                 (  )  _______________         Please fax records to Giuliano Crockett MD, [phone]     If you have any questions, please contact MATEO Green at [phone].           Patient Name: Gabriella Gilbert  : 1970  Patient Phone #: [phone]

## 2023-06-22 LAB
CLINICAL INFO: NORMAL
CYTO CVX: NORMAL
CYTOLOGIST CVX/VAG CYTO: NORMAL
CYTOLOGIST CVX/VAG CYTO: NORMAL
CYTOLOGY CMNT CVX/VAG CYTO-IMP: NORMAL
CYTOLOGY PAP THIN PREP EXPLANATION: NORMAL
DATE OF PREVIOUS PAP: NO
DATE PREVIOUS BX: NO
GEN CATEG CVX/VAG CYTO-IMP: NORMAL
HPV I/H RISK 4 DNA CVX QL NAA+PROBE: NOT DETECTED
LMP START DATE: NORMAL
MICROORGANISM CVX/VAG CYTO: NORMAL
PATHOLOGIST CVX/VAG CYTO: NORMAL
SERVICE CMNT-IMP: NORMAL
SPECIMEN SOURCE CVX/VAG CYTO: NORMAL
STAT OF ADQ CVX/VAG CYTO-IMP: NORMAL

## 2023-06-25 ENCOUNTER — PATIENT MESSAGE (OUTPATIENT)
Dept: INTERNAL MEDICINE | Facility: CLINIC | Age: 53
End: 2023-06-25
Payer: COMMERCIAL

## 2023-07-13 ENCOUNTER — PATIENT MESSAGE (OUTPATIENT)
Dept: INTERNAL MEDICINE | Facility: CLINIC | Age: 53
End: 2023-07-13
Payer: COMMERCIAL

## 2023-07-13 DIAGNOSIS — E66.01 MORBID OBESITY: Primary | ICD-10-CM

## 2023-07-26 DIAGNOSIS — E11.9 TYPE 2 DIABETES MELLITUS WITHOUT COMPLICATION, WITHOUT LONG-TERM CURRENT USE OF INSULIN: ICD-10-CM

## 2023-07-26 RX ORDER — ATORVASTATIN CALCIUM 10 MG/1
10 TABLET, FILM COATED ORAL DAILY
Qty: 90 TABLET | Refills: 3 | Status: SHIPPED | OUTPATIENT
Start: 2023-07-26 | End: 2024-07-25

## 2023-07-26 NOTE — TELEPHONE ENCOUNTER
Care Due:                  Date            Visit Type   Department     Provider  --------------------------------------------------------------------------------                                NP -                              PRIMARY      [facility] INTERNAL  Last Visit: 05-      CARE ([facility])   MEDICINE       Giuliano VERGARA Keira                              EP -                              PRIMARY      [facility] INTERNAL  Next Visit: 11-      CARE ([facility])   MEDICINE       Giuliano VERGARA Keira                                                            Last  Test          Frequency    Reason                     Performed    Due Date  --------------------------------------------------------------------------------    HBA1C.......  6 months...  metFORMIN................  04-   10-    Health [facility] Embedded Care Due Messages. Reference number: [id number].   7/26/2023 7:33:20 AM CDT

## 2023-09-05 ENCOUNTER — TELEPHONE (OUTPATIENT)
Dept: SURGERY | Facility: CLINIC | Age: 53
End: 2023-09-05
Payer: COMMERCIAL

## 2023-09-08 ENCOUNTER — TELEPHONE (OUTPATIENT)
Dept: BARIATRICS | Facility: CLINIC | Age: 53
End: 2023-09-08
Payer: COMMERCIAL

## 2023-09-15 ENCOUNTER — OFFICE VISIT (OUTPATIENT)
Dept: BARIATRICS | Facility: CLINIC | Age: 53
End: 2023-09-15
Payer: COMMERCIAL

## 2023-09-15 VITALS — HEART RATE: 54 BPM | WEIGHT: 293 LBS | OXYGEN SATURATION: 99 % | HEIGHT: 69 IN | BODY MASS INDEX: 43.4 KG/M2

## 2023-09-15 DIAGNOSIS — Z71.3 ENCOUNTER FOR WEIGHT LOSS COUNSELING: ICD-10-CM

## 2023-09-15 DIAGNOSIS — E66.01 CLASS 3 SEVERE OBESITY DUE TO EXCESS CALORIES WITH SERIOUS COMORBIDITY AND BODY MASS INDEX (BMI) OF 45.0 TO 49.9 IN ADULT: Primary | ICD-10-CM

## 2023-09-15 DIAGNOSIS — I10 ESSENTIAL HYPERTENSION: ICD-10-CM

## 2023-09-15 DIAGNOSIS — Z90.3 HISTORY OF SLEEVE GASTRECTOMY: ICD-10-CM

## 2023-09-15 PROCEDURE — 99999 PR PBB SHADOW E&M-EST. PATIENT-LVL IV: ICD-10-PCS | Mod: PBBFAC,,, | Performed by: STUDENT IN AN ORGANIZED HEALTH CARE EDUCATION/TRAINING PROGRAM

## 2023-09-15 PROCEDURE — 3008F BODY MASS INDEX DOCD: CPT | Mod: CPTII,S$GLB,, | Performed by: STUDENT IN AN ORGANIZED HEALTH CARE EDUCATION/TRAINING PROGRAM

## 2023-09-15 PROCEDURE — 4010F ACE/ARB THERAPY RXD/TAKEN: CPT | Mod: CPTII,S$GLB,, | Performed by: STUDENT IN AN ORGANIZED HEALTH CARE EDUCATION/TRAINING PROGRAM

## 2023-09-15 PROCEDURE — 1159F MED LIST DOCD IN RCRD: CPT | Mod: CPTII,S$GLB,, | Performed by: STUDENT IN AN ORGANIZED HEALTH CARE EDUCATION/TRAINING PROGRAM

## 2023-09-15 PROCEDURE — 3044F HG A1C LEVEL LT 7.0%: CPT | Mod: CPTII,S$GLB,, | Performed by: STUDENT IN AN ORGANIZED HEALTH CARE EDUCATION/TRAINING PROGRAM

## 2023-09-15 PROCEDURE — 3044F PR MOST RECENT HEMOGLOBIN A1C LEVEL <7.0%: ICD-10-PCS | Mod: CPTII,S$GLB,, | Performed by: STUDENT IN AN ORGANIZED HEALTH CARE EDUCATION/TRAINING PROGRAM

## 2023-09-15 PROCEDURE — 1160F PR REVIEW ALL MEDS BY PRESCRIBER/CLIN PHARMACIST DOCUMENTED: ICD-10-PCS | Mod: CPTII,S$GLB,, | Performed by: STUDENT IN AN ORGANIZED HEALTH CARE EDUCATION/TRAINING PROGRAM

## 2023-09-15 PROCEDURE — 1160F RVW MEDS BY RX/DR IN RCRD: CPT | Mod: CPTII,S$GLB,, | Performed by: STUDENT IN AN ORGANIZED HEALTH CARE EDUCATION/TRAINING PROGRAM

## 2023-09-15 PROCEDURE — 4010F PR ACE/ARB THEARPY RXD/TAKEN: ICD-10-PCS | Mod: CPTII,S$GLB,, | Performed by: STUDENT IN AN ORGANIZED HEALTH CARE EDUCATION/TRAINING PROGRAM

## 2023-09-15 PROCEDURE — 3008F PR BODY MASS INDEX (BMI) DOCUMENTED: ICD-10-PCS | Mod: CPTII,S$GLB,, | Performed by: STUDENT IN AN ORGANIZED HEALTH CARE EDUCATION/TRAINING PROGRAM

## 2023-09-15 PROCEDURE — 99999 PR PBB SHADOW E&M-EST. PATIENT-LVL IV: CPT | Mod: PBBFAC,,, | Performed by: STUDENT IN AN ORGANIZED HEALTH CARE EDUCATION/TRAINING PROGRAM

## 2023-09-15 PROCEDURE — 99204 OFFICE O/P NEW MOD 45 MIN: CPT | Mod: S$GLB,,, | Performed by: STUDENT IN AN ORGANIZED HEALTH CARE EDUCATION/TRAINING PROGRAM

## 2023-09-15 PROCEDURE — 1159F PR MEDICATION LIST DOCUMENTED IN MEDICAL RECORD: ICD-10-PCS | Mod: CPTII,S$GLB,, | Performed by: STUDENT IN AN ORGANIZED HEALTH CARE EDUCATION/TRAINING PROGRAM

## 2023-09-15 PROCEDURE — 99204 PR OFFICE/OUTPT VISIT, NEW, LEVL IV, 45-59 MIN: ICD-10-PCS | Mod: S$GLB,,, | Performed by: STUDENT IN AN ORGANIZED HEALTH CARE EDUCATION/TRAINING PROGRAM

## 2023-09-15 RX ORDER — TOPIRAMATE 25 MG/1
25 TABLET ORAL 2 TIMES DAILY
Qty: 180 TABLET | Refills: 0 | Status: SHIPPED | OUTPATIENT
Start: 2023-09-15 | End: 2023-11-06 | Stop reason: SDUPTHER

## 2023-09-15 NOTE — PROGRESS NOTES
"Subjective     Patient ID: Gabriella Gilbert is a 53 y.o. female.    Chief Complaint: Obesity and Consult    Patient presents for treatment of obesity.   Gastric Sleeve in 2018  380 lbs prior to sleeve  Lowest weight after sleeve was 240 lbs  Has started gaining weight about 1.5 years ago - menopausal, increased stress, changed job positions  Increased cravings  Prescribed phentermine by surgeon but "didn't agree" with her  Metformin caused headaches    Co-morbidities  HTN  Non-toxic multinodular goiter  Depression    Mother had thyroid cancer    Current Physical Activity  3-4 days/week treadmill, bike, free weights    Current Eating Habits  Breakfast - coffee (almond milk and splenda), oatmeal or grits, eggs, acevedo  Lunch - protein, salad, leftovers, hospital cafeteria  Dinner - uses airfryer, a lot of chicken, sometimes steak, pasta, rice  Snacks - tuna packs, string cheese, but also grabs the unhealthy snacks  Beverages - no carbonated drinks, crystal light, water    Medical Weight Loss  9/15/2023:  309.3 lbs, BMI 46.3, BFP 51.2%, .3 lbs, SMM 83.6 lbs, BMR 1850 kcal      Review of Systems   Constitutional:  Negative for chills and fever.   Respiratory:  Negative for shortness of breath.    Cardiovascular:  Negative for chest pain.   Gastrointestinal:  Negative for abdominal pain, nausea and vomiting.   Neurological:  Negative for dizziness and light-headedness.   Psychiatric/Behavioral:  The patient is not nervous/anxious.           Objective    Latest Reference Range & Units 04/13/23 07:26   WBC 3.90 - 12.70 K/uL 4.01   RBC 4.00 - 5.40 M/uL 4.82   Hemoglobin 12.0 - 16.0 g/dL 13.2   Hematocrit 37.0 - 48.5 % 41.8   MCV 82 - 98 fL 87   MCH 27.0 - 31.0 pg 27.4   MCHC 32.0 - 36.0 g/dL 31.6 (L)   RDW 11.5 - 14.5 % 13.1   Platelets 150 - 450 K/uL 296   MPV 9.2 - 12.9 fL 9.1 (L)   Gran % 38.0 - 73.0 % 45.0   Lymph % 18.0 - 48.0 % 40.9   Mono % 4.0 - 15.0 % 9.7   Eosinophil % 0.0 - 8.0 % 3.7   Basophil % 0.0 - 1.9 % 0.5 "   Immature Granulocytes 0.0 - 0.5 % 0.2   Gran # (ANC) 1.8 - 7.7 K/uL 1.8   Lymph # 1.0 - 4.8 K/uL 1.6   Mono # 0.3 - 1.0 K/uL 0.4   Eos # 0.0 - 0.5 K/uL 0.2   Baso # 0.00 - 0.20 K/uL 0.02   Immature Grans (Abs) 0.00 - 0.04 K/uL 0.01   nRBC 0 /100 WBC 0   Differential Method  Automated   Sodium 136 - 145 mmol/L 142   Potassium 3.5 - 5.1 mmol/L 3.9   Chloride 95 - 110 mmol/L 105   CO2 23 - 29 mmol/L 26   Anion Gap 8 - 16 mmol/L 11   BUN 6 - 20 mg/dL 10   Creatinine 0.5 - 1.4 mg/dL 0.8   eGFR >60 mL/min/1.73 m^2 >60.0   Glucose 70 - 110 mg/dL 92   Calcium 8.7 - 10.5 mg/dL 9.5   Alkaline Phosphatase 55 - 135 U/L 89   PROTEIN TOTAL 6.0 - 8.4 g/dL 7.5   Albumin 3.5 - 5.2 g/dL 3.6   BILIRUBIN TOTAL 0.1 - 1.0 mg/dL 0.7   AST 10 - 40 U/L 22   ALT 10 - 44 U/L 16   Cholesterol 120 - 199 mg/dL 210 (H)   HDL 40 - 75 mg/dL 62   HDL/Cholesterol Ratio 20.0 - 50.0 % 29.5   LDL Cholesterol External 63.0 - 159.0 mg/dL 122.0   Non-HDL Cholesterol mg/dL 148   Total Cholesterol/HDL Ratio 2.0 - 5.0  3.4   Triglycerides 30 - 150 mg/dL 130   BNP 0 - 99 pg/mL 21   Vit D, 25-Hydroxy 30 - 96 ng/mL 34   Hemoglobin A1C External 4.0 - 5.6 % 5.5   Estimated Avg Glucose 68 - 131 mg/dL 111   TSH 0.400 - 4.000 uIU/mL 1.226   (L): Data is abnormally low  (H): Data is abnormally high    Vitals:    09/15/23 0800   Pulse: (!) 54       Physical Exam  Vitals reviewed.   Constitutional:       General: She is not in acute distress.     Appearance: Normal appearance. She is obese. She is not ill-appearing, toxic-appearing or diaphoretic.   HENT:      Head: Normocephalic and atraumatic.   Cardiovascular:      Rate and Rhythm: Normal rate.   Pulmonary:      Effort: Pulmonary effort is normal. No respiratory distress.   Skin:     General: Skin is warm and dry.   Neurological:      Mental Status: She is alert and oriented to person, place, and time.            Assessment and Plan     1. Class 3 severe obesity due to excess calories with serious comorbidity and  body mass index (BMI) of 45.0 to 49.9 in adult  -     topiramate (TOPAMAX) 25 MG tablet; Take 1 tablet (25 mg total) by mouth 2 (two) times daily.  Dispense: 180 tablet; Refill: 0    2. Essential hypertension  Overview:  Formatting of this note might be different from the original.  dx update      3. History of sleeve gastrectomy    4. Encounter for weight loss counseling        - Log all food and beverage intake with a daily calorie goal of 1500 calories per day    - Moderate intensity aerobic exercise for 150-300 minutes per week

## 2023-09-15 NOTE — PATIENT INSTRUCTIONS
Topiramate is approved for migraine and seizure prevention. Weight loss is a common side effect that is well documented. Other potential side effects include a rash, vision changes, paresthesias, forgetfulness, fatigue, kidney stones, and upset stomach.             Copyright © 2011, [facility]. For more information about The Healthy Eating Plate, please see The Nutrition Source, Department of Nutrition, [employer], www.thenutritionsource.org, and [employer], www.health.[employer].edu.      Meal Planning & Grocery Shopping    Meal planning builds the foundation for healthy eating. When you have structured ideas for healthy meals and foods available at home to prepare those meals, weight control becomes easier.  If only healthy foods are available at home, then you will be much more likely to eat healthy foods. And you will be less likely to go to a restaurant or  a fast food meal, which tend to be unhealthy and higher in calories than meals prepared at home.      Take 5-10 minutes each week to plan meals for the next 7 days.  Make a grocery list based on the meal plan.    Grocery Shopping Tips:  Shop on a full stomach.  Schedule your shopping for times when you are most motivated and able to be disciplined about your purchases. For example, after a stressful day at work it may be difficult to make the healthiest choices. Shopping at other times, such as early in the morning or after dinner, may be easier.  Focus your shopping on the outside aisles of the store, which tend to contain more fresh foods and lower calorie foods. The inside aisles tend to have more processed foods.  Stick to your list. Avoid buying unhealthy items just because they are on sale.   Compare nutrition labels to check the number of calories and percentage of fat.      What to buy:    Vegetables  Fresh vegetables  Frozen vegetables with no sauce or added salt  Canned vegetables with no  sauce or added salt    Protein  Lean meats, such as chicken and turkey  Limit red meats, such as beef to no more than 1x/week  Limit processed meats, such as cold cuts, acevedo, sausage, and hot dogs. Look for brands that have no nitrites and are minimally processed. Consider turkey sausage or turkey acevedo.  Fish and Shellfish  Eggs  Dried beans  Canned beans (reduced sodium)    Fat  Use healthy oils, such as olive oil or canola oil, for cooking, salad dressings, etc.  Unflavored nuts and seeds  Nut butters (no added sugar)    Dairy  Yogurt (no sugar added)  Cheese  Low-fat milk  Unsweetened nondairy milks (almond milk, soy milk, etc)    Fruit  Fresh Fruit  Frozen fruit with no added sugar  Canned fruit with no added sugar  Dried fruit with no added sugar  100% fruit juice    Whole Grains  Single ingredient grains, such as oats, quinoa, brown rice  Whole-wheat pasta  Sprouted whole-grain bread    What to avoid:  - Avoid fried foods  - Avoid foods with added sugar  - Avoid sugar-sweetened beverages  - Avoid ultra-processed foods      Lifestyle Activity    Lifestyle activity consists of all the activities that burn calories during the course of a normal day. Using the stairs, washing the dishes, or even getting up to turn off the television are all examples of lifestyle activity. All activities, no matter how small, burn calories. Increasing lifestyle activity can help with weight control, so building physical activity into your everyday routine is important.     A pedometer is a tool that can help you track how much lifestyle activity you are getting. It can help you stay active. A pedometer counts each step you take and displays your total steps on the screen. Many smartphones, including iPhoAction and Androids, have applications that automatically count your steps. If you decide to use this method, make sure you are holding or wearing your smartphone so it can count all of your steps.     During the next 2 weeks, aim  for 5,000 or more steps per day. Each week aim to increase your daily step goal by 250 so that you will reach an average of 10,000 steps per day (equal to walking 4 to 5 miles).     Start making more active choices in your routine in order to increase the amount of walking you do each day.     Strategies to Increase Lifestyle Activity:    Take several 5-10-minute walks during the day.   Set a reminder to take a 5 minute break from your desk every hour.   Choose the farthest entrance to a building that you are entering.   Host walking meetings at work.   Walk down the salguero to contact co-workers face-to-face, instead of emailing or calling.  Walk to a restroom, water cooler, or copy machine on a different floor at work.   Walk during your lunch break.   Park farther away in parking lots.   Get off the bus or train earlier and walk farther to your destination.   Take the stairs rather than the elevator or the escalator.   Start a walking club with co-workers or neighbors.   Walk - don't drive - for trips less than one mile.   Take an after-dinner walk with family.   Go for a walk while talking on your wireless phone.   Walk the dog more often.   If you prefer to stay indoors because of the weather, try walking in a shopping mall or doing laps around a large store.   Purchase a treadmill to use at home.   Schedule time for walking every week and stick to it like any other appointment.   Or think of your own strategy: _______________________________       Physical Activity    Physical activity improves your health and helps with weight control, especially weight loss maintenance.      When starting to incorporate an exercise routine into your day, it is helpful to set specific goals.  For example, I will walk at moderate intensity from 12:30-12:45pm on Monday, Wednesday, and Friday.  Schedule your exercise time into your calendar.  Think of any potential barriers that may come up and prevent you from exercising.   Develop solutions or back-up plans for when these barriers may arise.    Walking is an ideal activity to start with if you do not currently have an exercise routine. You can make the activity more fun by doing it with a friend or listening to music or a book on tape while you do it. If you don't enjoy walking, think of other activities you like, such as bike riding or swimming.  Other alternatives include group fitness classes or exercise at home using DVDs, Apps, etc.    If you are new to exercising, then start with 10 minutes 3-4 days per week.  After two weeks, increase to 15 minutes 3-4 days per week.  If you already exercise more than this, continue at your higher level, or increase by 10-15 minutes if able.         Aerobic Activity  Aerobic Activity gets you breathing harder and your heart beating faster.  Eventually, you should work up to 150 - 300 minutes of moderate-intensity aerobic activity every week or 75 - 150 minutes of vigorous-intensity aerobic activity every week.  An easy way to gauge the intensity of your activity is with the Talk Test.  During moderate activity, you should be able to talk, but not sing without having to pause for a breath.  During vigorous activity, you shouldn't be able to say more than a few words without pausing for a breath.  You should not push yourself until you are completely out of breath, tired, or sore.    Examples of Aerobic Activity:  Walking  Running  Swimming  Biking  Playing sports like tennis or basketball  Dancing  Jumping Rope      Strength Training  It is also recommended that you perform muscle-strengthening activities at least 2 days per week.  Be sure to include activities that work all major muscle groups (legs, arms, abdomen, back, buttocks, chest, and shoulders)    Examples of Strength Training  Lifting weights  Working with resistance band  Using your body weight for resistance (squats, push-ups,  sit-ups)  Pilates  Yoga      https://health.gov/moveyourway/activity-planner/      Remember to keep a record of your activity to track your progress.

## 2023-11-06 ENCOUNTER — OFFICE VISIT (OUTPATIENT)
Dept: BARIATRICS | Facility: CLINIC | Age: 53
End: 2023-11-06
Payer: COMMERCIAL

## 2023-11-06 ENCOUNTER — PATIENT OUTREACH (OUTPATIENT)
Dept: ADMINISTRATIVE | Facility: HOSPITAL | Age: 53
End: 2023-11-06
Payer: COMMERCIAL

## 2023-11-06 ENCOUNTER — OFFICE VISIT (OUTPATIENT)
Dept: INTERNAL MEDICINE | Facility: CLINIC | Age: 53
End: 2023-11-06
Payer: COMMERCIAL

## 2023-11-06 VITALS
BODY MASS INDEX: 43.4 KG/M2 | HEART RATE: 57 BPM | WEIGHT: 293 LBS | OXYGEN SATURATION: 99 % | SYSTOLIC BLOOD PRESSURE: 142 MMHG | DIASTOLIC BLOOD PRESSURE: 80 MMHG | HEIGHT: 69 IN

## 2023-11-06 VITALS
SYSTOLIC BLOOD PRESSURE: 135 MMHG | OXYGEN SATURATION: 99 % | BODY MASS INDEX: 43.4 KG/M2 | DIASTOLIC BLOOD PRESSURE: 80 MMHG | HEIGHT: 69 IN | HEART RATE: 60 BPM | WEIGHT: 293 LBS

## 2023-11-06 DIAGNOSIS — I10 ESSENTIAL HYPERTENSION: Primary | ICD-10-CM

## 2023-11-06 DIAGNOSIS — E66.01 CLASS 3 SEVERE OBESITY DUE TO EXCESS CALORIES WITH SERIOUS COMORBIDITY AND BODY MASS INDEX (BMI) OF 45.0 TO 49.9 IN ADULT: Primary | ICD-10-CM

## 2023-11-06 DIAGNOSIS — E66.01 MORBID OBESITY: ICD-10-CM

## 2023-11-06 DIAGNOSIS — E11.9 TYPE 2 DIABETES MELLITUS WITHOUT COMPLICATION, WITHOUT LONG-TERM CURRENT USE OF INSULIN: ICD-10-CM

## 2023-11-06 DIAGNOSIS — I10 ESSENTIAL HYPERTENSION: ICD-10-CM

## 2023-11-06 DIAGNOSIS — Z71.3 ENCOUNTER FOR WEIGHT LOSS COUNSELING: ICD-10-CM

## 2023-11-06 DIAGNOSIS — Z90.3 HISTORY OF SLEEVE GASTRECTOMY: ICD-10-CM

## 2023-11-06 PROCEDURE — 99999 PR PBB SHADOW E&M-EST. PATIENT-LVL IV: ICD-10-PCS | Mod: PBBFAC,,, | Performed by: STUDENT IN AN ORGANIZED HEALTH CARE EDUCATION/TRAINING PROGRAM

## 2023-11-06 PROCEDURE — 99213 PR OFFICE/OUTPT VISIT, EST, LEVL III, 20-29 MIN: ICD-10-PCS | Mod: S$GLB,,, | Performed by: STUDENT IN AN ORGANIZED HEALTH CARE EDUCATION/TRAINING PROGRAM

## 2023-11-06 PROCEDURE — 3008F PR BODY MASS INDEX (BMI) DOCUMENTED: ICD-10-PCS | Mod: CPTII,S$GLB,, | Performed by: STUDENT IN AN ORGANIZED HEALTH CARE EDUCATION/TRAINING PROGRAM

## 2023-11-06 PROCEDURE — 3075F SYST BP GE 130 - 139MM HG: CPT | Mod: CPTII,S$GLB,, | Performed by: STUDENT IN AN ORGANIZED HEALTH CARE EDUCATION/TRAINING PROGRAM

## 2023-11-06 PROCEDURE — 3044F PR MOST RECENT HEMOGLOBIN A1C LEVEL <7.0%: ICD-10-PCS | Mod: CPTII,S$GLB,, | Performed by: STUDENT IN AN ORGANIZED HEALTH CARE EDUCATION/TRAINING PROGRAM

## 2023-11-06 PROCEDURE — 1159F PR MEDICATION LIST DOCUMENTED IN MEDICAL RECORD: ICD-10-PCS | Mod: CPTII,S$GLB,, | Performed by: STUDENT IN AN ORGANIZED HEALTH CARE EDUCATION/TRAINING PROGRAM

## 2023-11-06 PROCEDURE — 99999 PR PBB SHADOW E&M-EST. PATIENT-LVL IV: CPT | Mod: PBBFAC,,, | Performed by: STUDENT IN AN ORGANIZED HEALTH CARE EDUCATION/TRAINING PROGRAM

## 2023-11-06 PROCEDURE — 4010F ACE/ARB THERAPY RXD/TAKEN: CPT | Mod: CPTII,S$GLB,, | Performed by: STUDENT IN AN ORGANIZED HEALTH CARE EDUCATION/TRAINING PROGRAM

## 2023-11-06 PROCEDURE — 3079F PR MOST RECENT DIASTOLIC BLOOD PRESSURE 80-89 MM HG: ICD-10-PCS | Mod: CPTII,S$GLB,, | Performed by: STUDENT IN AN ORGANIZED HEALTH CARE EDUCATION/TRAINING PROGRAM

## 2023-11-06 PROCEDURE — 99213 OFFICE O/P EST LOW 20 MIN: CPT | Mod: S$GLB,,, | Performed by: STUDENT IN AN ORGANIZED HEALTH CARE EDUCATION/TRAINING PROGRAM

## 2023-11-06 PROCEDURE — 1160F PR REVIEW ALL MEDS BY PRESCRIBER/CLIN PHARMACIST DOCUMENTED: ICD-10-PCS | Mod: CPTII,S$GLB,, | Performed by: STUDENT IN AN ORGANIZED HEALTH CARE EDUCATION/TRAINING PROGRAM

## 2023-11-06 PROCEDURE — 3077F PR MOST RECENT SYSTOLIC BLOOD PRESSURE >= 140 MM HG: ICD-10-PCS | Mod: CPTII,S$GLB,, | Performed by: STUDENT IN AN ORGANIZED HEALTH CARE EDUCATION/TRAINING PROGRAM

## 2023-11-06 PROCEDURE — 1159F MED LIST DOCD IN RCRD: CPT | Mod: CPTII,S$GLB,, | Performed by: STUDENT IN AN ORGANIZED HEALTH CARE EDUCATION/TRAINING PROGRAM

## 2023-11-06 PROCEDURE — 3079F DIAST BP 80-89 MM HG: CPT | Mod: CPTII,S$GLB,, | Performed by: STUDENT IN AN ORGANIZED HEALTH CARE EDUCATION/TRAINING PROGRAM

## 2023-11-06 PROCEDURE — 99214 OFFICE O/P EST MOD 30 MIN: CPT | Mod: S$GLB,,, | Performed by: STUDENT IN AN ORGANIZED HEALTH CARE EDUCATION/TRAINING PROGRAM

## 2023-11-06 PROCEDURE — 3044F HG A1C LEVEL LT 7.0%: CPT | Mod: CPTII,S$GLB,, | Performed by: STUDENT IN AN ORGANIZED HEALTH CARE EDUCATION/TRAINING PROGRAM

## 2023-11-06 PROCEDURE — 99999 PR PBB SHADOW E&M-EST. PATIENT-LVL III: ICD-10-PCS | Mod: PBBFAC,,, | Performed by: STUDENT IN AN ORGANIZED HEALTH CARE EDUCATION/TRAINING PROGRAM

## 2023-11-06 PROCEDURE — 4010F PR ACE/ARB THEARPY RXD/TAKEN: ICD-10-PCS | Mod: CPTII,S$GLB,, | Performed by: STUDENT IN AN ORGANIZED HEALTH CARE EDUCATION/TRAINING PROGRAM

## 2023-11-06 PROCEDURE — 3008F BODY MASS INDEX DOCD: CPT | Mod: CPTII,S$GLB,, | Performed by: STUDENT IN AN ORGANIZED HEALTH CARE EDUCATION/TRAINING PROGRAM

## 2023-11-06 PROCEDURE — 99999 PR PBB SHADOW E&M-EST. PATIENT-LVL III: CPT | Mod: PBBFAC,,, | Performed by: STUDENT IN AN ORGANIZED HEALTH CARE EDUCATION/TRAINING PROGRAM

## 2023-11-06 PROCEDURE — 1160F RVW MEDS BY RX/DR IN RCRD: CPT | Mod: CPTII,S$GLB,, | Performed by: STUDENT IN AN ORGANIZED HEALTH CARE EDUCATION/TRAINING PROGRAM

## 2023-11-06 PROCEDURE — 3077F SYST BP >= 140 MM HG: CPT | Mod: CPTII,S$GLB,, | Performed by: STUDENT IN AN ORGANIZED HEALTH CARE EDUCATION/TRAINING PROGRAM

## 2023-11-06 PROCEDURE — 3075F PR MOST RECENT SYSTOLIC BLOOD PRESS GE 130-139MM HG: ICD-10-PCS | Mod: CPTII,S$GLB,, | Performed by: STUDENT IN AN ORGANIZED HEALTH CARE EDUCATION/TRAINING PROGRAM

## 2023-11-06 PROCEDURE — 99214 PR OFFICE/OUTPT VISIT, EST, LEVL IV, 30-39 MIN: ICD-10-PCS | Mod: S$GLB,,, | Performed by: STUDENT IN AN ORGANIZED HEALTH CARE EDUCATION/TRAINING PROGRAM

## 2023-11-06 RX ORDER — TOPIRAMATE 50 MG/1
50 TABLET, FILM COATED ORAL 2 TIMES DAILY
Qty: 180 TABLET | Refills: 0 | Status: SHIPPED | OUTPATIENT
Start: 2023-11-06 | End: 2024-02-19

## 2023-11-06 NOTE — PROGRESS NOTES
SUBJECTIVE     Chief Complaint   Patient presents with    Follow-up       HPI  Gabriella Gilbert is a 53 y.o. female with  HTN, T2DM, HLD, obesity  that presents for follow-up. LOV 5/5/23.     Established with Bariatrics.   Seen in bariatric clinic today. Prescribed Topamax 50 mg BID. Recently increased from 25 mg BID. Tolerating well. Down 9 lbs per home scale. Instructed to have calorie goal of 1500 per day. Instructed to do mod intensity aerobic exercise 150-300 min/week. Patient planning to start walking.     BP mildly elevated on presentation, normotensive on recheck.   Tolerating and compliant with Losartan, Furosemide.   Denies chest pain, palpitations, SOB, blurry visions, headaches, presyncopal symptoms.       PAST MEDICAL HISTORY:  Past Medical History:   Diagnosis Date    Encounter for blood transfusion 1970    At birth    GERD (gastroesophageal reflux disease)     Heart burn    Hypertension     Type 2 diabetes mellitus without complication, without long-term current use of insulin 01/15/2009       PAST SURGICAL HISTORY:  Past Surgical History:   Procedure Laterality Date    BARIATRIC SURGERY      SLEEVE 2018    DILATION AND CURETTAGE OF UTERUS      GASTRECTOMY  2018    [facility]       FAMILY HISTORY:  Family History   Problem Relation Age of Onset    No Known Problems Paternal Grandfather     No Known Problems Paternal Grandmother     Cataracts Maternal Grandmother     Heart disease Maternal Grandmother     No Known Problems Maternal Grandfather     No Known Problems Father     Thyroid cancer Mother     Hypertension Mother     Cataracts Mother     Cancer Mother         Thyroid. Papillary    No Known Problems Brother     No Known Problems Sister     No Known Problems Maternal Aunt     No Known Problems Maternal Uncle     No Known Problems Paternal Aunt     No Known Problems Paternal Uncle     Breast cancer Neg Hx     Ovarian cancer Neg Hx     Colon cancer Neg Hx        ALLERGIES AND MEDICATIONS: updated and  "reviewed.  Review of patient's allergies indicates:  No Known Allergies  Current Outpatient Medications   Medication Sig Dispense Refill    atorvastatin (LIPITOR) 10 MG tablet Take 1 tablet (10 mg total) by mouth once daily. 90 tablet 3    furosemide (LASIX) 20 MG tablet TAKE 1 TAB BY MOUTH DAILY X 3 DAYS, THEN DAILY AS NEEDED FOR LEG SWELLING. 30 tablet 0    losartan (COZAAR) 50 MG tablet Take 1.5 tablets (75 mg total) by mouth once daily. 135 tablet 3    pediatric multivit-iron-min (FLINTSTONES COMPLETE, IRON,) Chew Take 2 tablets by mouth once daily.      topiramate (TOPAMAX) 50 MG tablet Take 1 tablet (50 mg total) by mouth 2 (two) times daily. 180 tablet 0    traZODone (DESYREL) 50 MG tablet TAKE 1 TABLET BY MOUTH NIGHTLY AS NEEDED FOR INSOMNIA. 30 tablet 11     No current facility-administered medications for this visit.       ROS  Review of Systems   Constitutional:  Negative for activity change, chills and fever.   HENT:  Negative for congestion and hearing loss.    Eyes:  Negative for pain and visual disturbance.   Respiratory:  Negative for cough and shortness of breath.    Cardiovascular:  Negative for chest pain and palpitations.   Gastrointestinal:  Negative for abdominal pain, constipation, diarrhea, nausea and vomiting.   Endocrine: Negative.    Genitourinary: Negative.    Musculoskeletal:  Negative for arthralgias and myalgias.   Skin: Negative.    Allergic/Immunologic: Negative.    Neurological:  Negative for dizziness, light-headedness and headaches.   Hematological: Negative.          OBJECTIVE     Physical Exam  Vitals:    11/06/23 1505   BP: (!) 140/84   Pulse: (!) 41    Body mass index is 45.52 kg/m².  Weight: (!) 137.8 kg (303 lb 12.7 oz)   Height: 5' 8.5" (174 cm)     Physical Exam  Vitals reviewed.   Constitutional:       General: She is not in acute distress.     Appearance: Normal appearance. She is obese.   HENT:      Head: Normocephalic and atraumatic.      Mouth/Throat:      Mouth: " Mucous membranes are moist.      Pharynx: Oropharynx is clear.   Eyes:      Extraocular Movements: Extraocular movements intact.      Conjunctiva/sclera: Conjunctivae normal.      Pupils: Pupils are equal, round, and reactive to light.   Cardiovascular:      Rate and Rhythm: Normal rate and regular rhythm.      Pulses: Normal pulses.      Heart sounds: Normal heart sounds.   Pulmonary:      Effort: Pulmonary effort is normal.      Breath sounds: Normal breath sounds.   Abdominal:      General: There is no distension.   Musculoskeletal:         General: Normal range of motion.      Cervical back: Normal range of motion and neck supple.   Skin:     General: Skin is warm and dry.   Neurological:      General: No focal deficit present.      Mental Status: She is alert.           Health Maintenance         Date Due Completion Date    Colorectal Cancer Screening Never done ---    Shingles Vaccine (1 of 2) Never done ---    COVID-19 Vaccine (4 - 2023-24 season) 09/01/2023 11/2/2021    HIV Screening 04/12/2029 (Originally 6/9/1985) ---    TETANUS VACCINE 05/13/2024 5/13/2014    Mammogram 05/16/2024 5/16/2023    Lipid Panel 04/13/2028 4/13/2023    Cervical Cancer Screening 06/14/2028 6/14/2023              ASSESSMENT     53 y.o. female with     1. Essential hypertension    2. Morbid obesity    3. Type 2 diabetes mellitus without complication, without long-term current use of insulin        PLAN:     1. Essential hypertension  - Controlled on recheck. Continue current regimen.   - Continue monitoring BP at home.     2. Morbid obesity  - Stable on Topamax. Congratulated on weight loss. Encouraged continued follow up with Bariatric Medicine.     3. Type 2 diabetes mellitus without complication, without long-term current use of insulin  - Controlled off of medicine.   - Continue statin.           RTC in 6 months       Giuliano Crockett MD  Family Medicine  [facility]  11/06/2023    This document  was created using voice recognition software ([employer]). Although it may be edited, this document may contain errors related to incorrect recognition of the spoken word. Please call the physician if clarification is needed.       No follow-ups on file.

## 2023-11-06 NOTE — PROGRESS NOTES
Health Maintenance Due   Topic Date Due    Diabetes Urine Screening  Never done    Pneumococcal Vaccines (Age 0-64) (1 - PCV) Never done    Foot Exam  Never done    Colorectal Cancer Screening  Never done    Shingles Vaccine (1 of 2) Never done    Eye Exam  04/13/2023    COVID-19 Vaccine (4 - 2023-24 season) 09/01/2023    Hemoglobin A1c  10/13/2023     Triggered LINKS and reconciled immunizations. Updated Care Everywhere. Record request sent to Dr. Renetta Sales asking for a copy of pt's most recent colonoscopy results. Chart review completed.

## 2023-11-06 NOTE — PROGRESS NOTES
"Subjective     Patient ID: Gabriella Gilbert is a 53 y.o. female.    Chief Complaint: Follow-up, Obesity, and Weight Check    Patient presents for treatment of obesity.   Gastric Sleeve in 2018  380 lbs prior to sleeve  Lowest weight after sleeve was 240 lbs  Has started gaining weight about 1.5 years ago - menopausal, increased stress, changed job positions  Increased cravings  Prescribed phentermine by surgeon but "didn't agree" with her  Metformin caused headaches    Co-morbidities  HTN  Non-toxic multinodular goiter  Depression    Mother had thyroid cancer    Current Physical Activity  3-4 days/week treadmill, bike, free weights    Current Eating Habits  Breakfast - coffee (almond milk and splenda), oatmeal or grits, eggs, acevedo  Lunch - protein, salad, leftovers, hospital cafeteria  Dinner - uses airfryer, a lot of chicken, sometimes steak, pasta, rice  Snacks - tuna packs, string cheese, but also grabs the unhealthy snacks  Beverages - no carbonated drinks, crystal light, water    Decreased snacking since starting topiramate.    Medical Weight Loss  9/15/2023:  309.3 lbs, BMI 46.3, BFP 51.2%, .3 lbs, SMM 83.6 lbs, BMR 1850 kcal  11/6/2023: 300.1 lbs, BMI 45, BFP 51%, .1 lbs, SMM 82 lbs, BMR 1811 kcal        Review of Systems   Constitutional:  Negative for chills and fever.   Respiratory:  Negative for shortness of breath.    Cardiovascular:  Negative for chest pain.   Gastrointestinal:  Negative for abdominal pain, nausea and vomiting.   Neurological:  Negative for dizziness and light-headedness.   Psychiatric/Behavioral:  The patient is not nervous/anxious.           Objective    Latest Reference Range & Units 04/13/23 07:26   WBC 3.90 - 12.70 K/uL 4.01   RBC 4.00 - 5.40 M/uL 4.82   Hemoglobin 12.0 - 16.0 g/dL 13.2   Hematocrit 37.0 - 48.5 % 41.8   MCV 82 - 98 fL 87   MCH 27.0 - 31.0 pg 27.4   MCHC 32.0 - 36.0 g/dL 31.6 (L)   RDW 11.5 - 14.5 % 13.1   Platelets 150 - 450 K/uL 296   MPV 9.2 - 12.9 fL " 9.1 (L)   Gran % 38.0 - 73.0 % 45.0   Lymph % 18.0 - 48.0 % 40.9   Mono % 4.0 - 15.0 % 9.7   Eosinophil % 0.0 - 8.0 % 3.7   Basophil % 0.0 - 1.9 % 0.5   Immature Granulocytes 0.0 - 0.5 % 0.2   Gran # (ANC) 1.8 - 7.7 K/uL 1.8   Lymph # 1.0 - 4.8 K/uL 1.6   Mono # 0.3 - 1.0 K/uL 0.4   Eos # 0.0 - 0.5 K/uL 0.2   Baso # 0.00 - 0.20 K/uL 0.02   Immature Grans (Abs) 0.00 - 0.04 K/uL 0.01   nRBC 0 /100 WBC 0   Differential Method  Automated   Sodium 136 - 145 mmol/L 142   Potassium 3.5 - 5.1 mmol/L 3.9   Chloride 95 - 110 mmol/L 105   CO2 23 - 29 mmol/L 26   Anion Gap 8 - 16 mmol/L 11   BUN 6 - 20 mg/dL 10   Creatinine 0.5 - 1.4 mg/dL 0.8   eGFR >60 mL/min/1.73 m^2 >60.0   Glucose 70 - 110 mg/dL 92   Calcium 8.7 - 10.5 mg/dL 9.5   Alkaline Phosphatase 55 - 135 U/L 89   PROTEIN TOTAL 6.0 - 8.4 g/dL 7.5   Albumin 3.5 - 5.2 g/dL 3.6   BILIRUBIN TOTAL 0.1 - 1.0 mg/dL 0.7   AST 10 - 40 U/L 22   ALT 10 - 44 U/L 16   Cholesterol 120 - 199 mg/dL 210 (H)   HDL 40 - 75 mg/dL 62   HDL/Cholesterol Ratio 20.0 - 50.0 % 29.5   LDL Cholesterol External 63.0 - 159.0 mg/dL 122.0   Non-HDL Cholesterol mg/dL 148   Total Cholesterol/HDL Ratio 2.0 - 5.0  3.4   Triglycerides 30 - 150 mg/dL 130   BNP 0 - 99 pg/mL 21   Vit D, 25-Hydroxy 30 - 96 ng/mL 34   Hemoglobin A1C External 4.0 - 5.6 % 5.5   Estimated Avg Glucose 68 - 131 mg/dL 111   TSH 0.400 - 4.000 uIU/mL 1.226   (L): Data is abnormally low  (H): Data is abnormally high    Vitals:    11/06/23 0820   BP: (!) 142/80   Pulse: (!) 57         Physical Exam  Vitals reviewed.   Constitutional:       General: She is not in acute distress.     Appearance: Normal appearance. She is obese. She is not ill-appearing, toxic-appearing or diaphoretic.   HENT:      Head: Normocephalic and atraumatic.   Cardiovascular:      Rate and Rhythm: Normal rate.   Pulmonary:      Effort: Pulmonary effort is normal. No respiratory distress.   Skin:     General: Skin is warm and dry.   Neurological:      Mental  Status: She is alert and oriented to person, place, and time.            Assessment and Plan     1. Class 3 severe obesity due to excess calories with serious comorbidity and body mass index (BMI) of 45.0 to 49.9 in adult  -     topiramate (TOPAMAX) 50 MG tablet; Take 1 tablet (50 mg total) by mouth 2 (two) times daily.  Dispense: 180 tablet; Refill: 0    2. Essential hypertension  Overview:  Formatting of this note might be different from the original.  dx update      3. History of sleeve gastrectomy    4. Encounter for weight loss counseling      - Topiramate 25 mg 2 tabs in AM, 1 tab in PM till finish bottle of 25 mg tablets. Then start 50 mg in AM and 50 mg in PM.    - Log all food and beverage intake with a daily calorie goal of 1500 calories per day    - Moderate intensity aerobic exercise for 150-300 minutes per week

## 2023-11-06 NOTE — LETTER
AUTHORIZATION FOR RELEASE OF   CONFIDENTIAL INFORMATION    Dear Dr. Renetta Sales,    We are seeing Gabriella Gilbert, date of birth 1970, in the clinic at [facility]. Giuliano Crockett MD is the patient's PCP. Gabriella Gilbert has an outstanding lab/procedure at the time we reviewed her chart. In order to help keep her health information updated, she has authorized us to request the following medical record(s):        (  )  MAMMOGRAM                                      ( X )  COLONOSCOPY-- 2016      (  )  PAP SMEAR                                          (  )  OUTSIDE LAB RESULTS     (  )  DEXA SCAN                                          (  )  EYE EXAM            (  )  FOOT EXAM                                          (  )  ENTIRE RECORD     (  )  OUTSIDE IMMUNIZATIONS                 (  )  _______________         Please fax records to Giuliano Crockett MD, [phone]     If you have any questions, please contact MATEO Green at [phone].           Patient Name: Gabriella Gilbert  : 1970  Patient Phone #: [phone]

## 2023-11-08 DIAGNOSIS — E11.9 TYPE 2 DIABETES MELLITUS WITHOUT COMPLICATION: ICD-10-CM

## 2023-11-13 ENCOUNTER — PATIENT MESSAGE (OUTPATIENT)
Dept: ADMINISTRATIVE | Facility: HOSPITAL | Age: 53
End: 2023-11-13
Payer: COMMERCIAL

## 2023-11-18 ENCOUNTER — PATIENT MESSAGE (OUTPATIENT)
Dept: ADMINISTRATIVE | Facility: HOSPITAL | Age: 53
End: 2023-11-18
Payer: COMMERCIAL

## 2023-11-19 DIAGNOSIS — Z12.11 SCREENING FOR COLON CANCER: ICD-10-CM

## 2023-12-12 LAB — HEMOCCULT STL QL IA: NEGATIVE

## 2024-01-11 ENCOUNTER — PATIENT MESSAGE (OUTPATIENT)
Dept: ADMINISTRATIVE | Facility: HOSPITAL | Age: 54
End: 2024-01-11
Payer: COMMERCIAL

## 2024-02-12 DIAGNOSIS — E66.01 CLASS 3 SEVERE OBESITY DUE TO EXCESS CALORIES WITH SERIOUS COMORBIDITY AND BODY MASS INDEX (BMI) OF 45.0 TO 49.9 IN ADULT: ICD-10-CM

## 2024-02-19 RX ORDER — TOPIRAMATE 50 MG/1
50 TABLET, FILM COATED ORAL 2 TIMES DAILY
Qty: 180 TABLET | Refills: 0 | Status: SHIPPED | OUTPATIENT
Start: 2024-02-19 | End: 2024-06-06

## 2024-03-27 DIAGNOSIS — E11.9 TYPE 2 DIABETES MELLITUS WITHOUT COMPLICATION, UNSPECIFIED WHETHER LONG TERM INSULIN USE: ICD-10-CM

## 2024-04-23 ENCOUNTER — LAB VISIT (OUTPATIENT)
Dept: LAB | Facility: HOSPITAL | Age: 54
End: 2024-04-23
Payer: COMMERCIAL

## 2024-04-23 DIAGNOSIS — E11.9 TYPE 2 DIABETES MELLITUS WITHOUT COMPLICATION: ICD-10-CM

## 2024-04-23 LAB
ESTIMATED AVG GLUCOSE: 97 MG/DL (ref 68–131)
HBA1C MFR BLD: 5 % (ref 4–5.6)

## 2024-04-23 PROCEDURE — 83036 HEMOGLOBIN GLYCOSYLATED A1C: CPT | Performed by: STUDENT IN AN ORGANIZED HEALTH CARE EDUCATION/TRAINING PROGRAM

## 2024-04-23 PROCEDURE — 36415 COLL VENOUS BLD VENIPUNCTURE: CPT | Performed by: STUDENT IN AN ORGANIZED HEALTH CARE EDUCATION/TRAINING PROGRAM

## 2024-04-24 DIAGNOSIS — E11.9 TYPE 2 DIABETES MELLITUS WITHOUT COMPLICATION, WITHOUT LONG-TERM CURRENT USE OF INSULIN: ICD-10-CM

## 2024-04-24 DIAGNOSIS — E11.9 TYPE 2 DIABETES MELLITUS WITHOUT COMPLICATION: ICD-10-CM

## 2024-04-29 ENCOUNTER — LAB VISIT (OUTPATIENT)
Dept: LAB | Facility: HOSPITAL | Age: 54
End: 2024-04-29
Payer: COMMERCIAL

## 2024-04-29 DIAGNOSIS — E11.9 TYPE 2 DIABETES MELLITUS WITHOUT COMPLICATION, WITHOUT LONG-TERM CURRENT USE OF INSULIN: ICD-10-CM

## 2024-04-29 DIAGNOSIS — E11.9 TYPE 2 DIABETES MELLITUS WITHOUT COMPLICATION: ICD-10-CM

## 2024-04-29 LAB
ALBUMIN SERPL BCP-MCNC: 3.7 G/DL (ref 3.5–5.2)
ALP SERPL-CCNC: 83 U/L (ref 55–135)
ALT SERPL W/O P-5'-P-CCNC: 22 U/L (ref 10–44)
ANION GAP SERPL CALC-SCNC: 7 MMOL/L (ref 8–16)
AST SERPL-CCNC: 21 U/L (ref 10–40)
BILIRUB SERPL-MCNC: 0.6 MG/DL (ref 0.1–1)
BUN SERPL-MCNC: 13 MG/DL (ref 6–20)
CALCIUM SERPL-MCNC: 9.1 MG/DL (ref 8.7–10.5)
CHLORIDE SERPL-SCNC: 107 MMOL/L (ref 95–110)
CHOLEST SERPL-MCNC: 149 MG/DL (ref 120–199)
CHOLEST/HDLC SERPL: 3 {RATIO} (ref 2–5)
CO2 SERPL-SCNC: 29 MMOL/L (ref 23–29)
CREAT SERPL-MCNC: 0.8 MG/DL (ref 0.5–1.4)
EST. GFR  (NO RACE VARIABLE): >60 ML/MIN/1.73 M^2
GLUCOSE SERPL-MCNC: 77 MG/DL (ref 70–110)
HDLC SERPL-MCNC: 49 MG/DL (ref 40–75)
HDLC SERPL: 32.9 % (ref 20–50)
LDLC SERPL CALC-MCNC: 83.2 MG/DL (ref 63–159)
NONHDLC SERPL-MCNC: 100 MG/DL
POTASSIUM SERPL-SCNC: 3.8 MMOL/L (ref 3.5–5.1)
PROT SERPL-MCNC: 7.5 G/DL (ref 6–8.4)
SODIUM SERPL-SCNC: 143 MMOL/L (ref 136–145)
TRIGL SERPL-MCNC: 84 MG/DL (ref 30–150)

## 2024-04-29 PROCEDURE — 80061 LIPID PANEL: CPT | Performed by: STUDENT IN AN ORGANIZED HEALTH CARE EDUCATION/TRAINING PROGRAM

## 2024-04-29 PROCEDURE — 36415 COLL VENOUS BLD VENIPUNCTURE: CPT | Performed by: STUDENT IN AN ORGANIZED HEALTH CARE EDUCATION/TRAINING PROGRAM

## 2024-04-29 PROCEDURE — 80053 COMPREHEN METABOLIC PANEL: CPT | Performed by: STUDENT IN AN ORGANIZED HEALTH CARE EDUCATION/TRAINING PROGRAM

## 2024-05-28 ENCOUNTER — PATIENT OUTREACH (OUTPATIENT)
Dept: ADMINISTRATIVE | Facility: HOSPITAL | Age: 54
End: 2024-05-28
Payer: COMMERCIAL

## 2024-05-28 NOTE — PROGRESS NOTES
Health Maintenance Due   Topic Date Due    Pneumococcal Vaccines (Age 0-64) (1 of 2 - PCV) Never done    Foot Exam  Never done    Shingles Vaccine (1 of 2) Never done    Eye Exam  04/13/2023    COVID-19 Vaccine (4 - 2023-24 season) 09/01/2023    TETANUS VACCINE  05/13/2024    Mammogram  05/16/2024     Triggered LINKS and reconciled immunizations. Updated Care Everywhere. Chart review completed as part of April [employer] List report.

## 2024-05-29 DIAGNOSIS — Z12.31 OTHER SCREENING MAMMOGRAM: ICD-10-CM

## 2024-06-06 ENCOUNTER — OFFICE VISIT (OUTPATIENT)
Dept: INTERNAL MEDICINE | Facility: CLINIC | Age: 54
End: 2024-06-06
Payer: COMMERCIAL

## 2024-06-06 ENCOUNTER — PATIENT MESSAGE (OUTPATIENT)
Dept: ADMINISTRATIVE | Facility: HOSPITAL | Age: 54
End: 2024-06-06
Payer: COMMERCIAL

## 2024-06-06 VITALS
BODY MASS INDEX: 42.03 KG/M2 | HEART RATE: 65 BPM | OXYGEN SATURATION: 99 % | HEIGHT: 69 IN | WEIGHT: 283.75 LBS | DIASTOLIC BLOOD PRESSURE: 80 MMHG | SYSTOLIC BLOOD PRESSURE: 130 MMHG

## 2024-06-06 DIAGNOSIS — E66.01 MORBID OBESITY: ICD-10-CM

## 2024-06-06 DIAGNOSIS — I10 ESSENTIAL HYPERTENSION: Primary | ICD-10-CM

## 2024-06-06 DIAGNOSIS — E11.9 TYPE 2 DIABETES MELLITUS WITHOUT COMPLICATION, WITHOUT LONG-TERM CURRENT USE OF INSULIN: ICD-10-CM

## 2024-06-06 DIAGNOSIS — F33.41 RECURRENT MAJOR DEPRESSIVE DISORDER, IN PARTIAL REMISSION: ICD-10-CM

## 2024-06-06 PROCEDURE — 3079F DIAST BP 80-89 MM HG: CPT | Mod: CPTII,S$GLB,, | Performed by: STUDENT IN AN ORGANIZED HEALTH CARE EDUCATION/TRAINING PROGRAM

## 2024-06-06 PROCEDURE — 1160F RVW MEDS BY RX/DR IN RCRD: CPT | Mod: CPTII,S$GLB,, | Performed by: STUDENT IN AN ORGANIZED HEALTH CARE EDUCATION/TRAINING PROGRAM

## 2024-06-06 PROCEDURE — 4010F ACE/ARB THERAPY RXD/TAKEN: CPT | Mod: CPTII,S$GLB,, | Performed by: STUDENT IN AN ORGANIZED HEALTH CARE EDUCATION/TRAINING PROGRAM

## 2024-06-06 PROCEDURE — 3061F NEG MICROALBUMINURIA REV: CPT | Mod: CPTII,S$GLB,, | Performed by: STUDENT IN AN ORGANIZED HEALTH CARE EDUCATION/TRAINING PROGRAM

## 2024-06-06 PROCEDURE — 3066F NEPHROPATHY DOC TX: CPT | Mod: CPTII,S$GLB,, | Performed by: STUDENT IN AN ORGANIZED HEALTH CARE EDUCATION/TRAINING PROGRAM

## 2024-06-06 PROCEDURE — 3008F BODY MASS INDEX DOCD: CPT | Mod: CPTII,S$GLB,, | Performed by: STUDENT IN AN ORGANIZED HEALTH CARE EDUCATION/TRAINING PROGRAM

## 2024-06-06 PROCEDURE — 99214 OFFICE O/P EST MOD 30 MIN: CPT | Mod: S$GLB,,, | Performed by: STUDENT IN AN ORGANIZED HEALTH CARE EDUCATION/TRAINING PROGRAM

## 2024-06-06 PROCEDURE — 1159F MED LIST DOCD IN RCRD: CPT | Mod: CPTII,S$GLB,, | Performed by: STUDENT IN AN ORGANIZED HEALTH CARE EDUCATION/TRAINING PROGRAM

## 2024-06-06 PROCEDURE — 3075F SYST BP GE 130 - 139MM HG: CPT | Mod: CPTII,S$GLB,, | Performed by: STUDENT IN AN ORGANIZED HEALTH CARE EDUCATION/TRAINING PROGRAM

## 2024-06-06 PROCEDURE — 99999 PR PBB SHADOW E&M-EST. PATIENT-LVL IV: CPT | Mod: PBBFAC,,, | Performed by: STUDENT IN AN ORGANIZED HEALTH CARE EDUCATION/TRAINING PROGRAM

## 2024-06-06 PROCEDURE — 3044F HG A1C LEVEL LT 7.0%: CPT | Mod: CPTII,S$GLB,, | Performed by: STUDENT IN AN ORGANIZED HEALTH CARE EDUCATION/TRAINING PROGRAM

## 2024-06-06 RX ORDER — LOSARTAN POTASSIUM 50 MG/1
50 TABLET ORAL DAILY
Start: 2024-06-06 | End: 2024-06-10

## 2024-06-06 RX ORDER — TIRZEPATIDE 10 MG/.5ML
10 INJECTION, SOLUTION SUBCUTANEOUS WEEKLY
COMMUNITY
Start: 2023-12-15 | End: 2024-10-30

## 2024-06-06 NOTE — PATIENT INSTRUCTIONS
Complete your mammogram for breast cancer screening.   Complete diabetic eye photo for screening for diabetic eye disease.

## 2024-06-06 NOTE — PROGRESS NOTES
SUBJECTIVE     Chief Complaint   Patient presents with    Follow-up     6 month       HPI  Gabriella Gilbert is a 54 y.o. female with  T2DM, HTN, h/o sleeve gastrectomy  that presents for follow-up. LOV 11/6/23.     Patient down 17lbs since last visit!  HTN -   Currently prescribed losartan 50 mg qd.  Patient endorses taking medication as directed.  Denies side effects or concerns while taking medication.  Patient not currently checking BP at home.  Denies headaches, vision changes, CP, palpitations, or other concerning symptoms.  Lab Results   Component Value Date    MICALBCREAT 4.4 04/23/2024     BP Readings from Last 3 Encounters:   10/30/24 136/76   06/06/24 130/80   11/06/23 135/80     T2DM   - Currently taking: Mounjaro  - Compliant with and tolerating meds well.  - Home readings: Not checking at home.   - Denies signs/symptoms of hyper/hypoglycemia.  - Most recent A1c: 5%  - Most recent Microalbumin: wnl in 4/2024.   - Takes a statin: Lipitor 10 mg   - Patient does have CKD and is on an ARB  - Eye exam: UTD  - Foot exam: UTD  - PNA vaccine: Declines  - Complications: N/A  Lab Results   Component Value Date    HGBA1C 4.9 10/31/2024    HGBA1C 5.0 04/23/2024    HGBA1C 5.5 04/13/2023     Lab Results   Component Value Date    LDLCALC 83.2 04/29/2024    CREATININE 0.8 10/31/2024   .         PAST MEDICAL HISTORY:  Past Medical History:   Diagnosis Date    Encounter for blood transfusion 1970    At birth    GERD (gastroesophageal reflux disease)     Heart burn    Hypertension     Type 2 diabetes mellitus without complication, without long-term current use of insulin 01/15/2009       PAST SURGICAL HISTORY:  Past Surgical History:   Procedure Laterality Date    BARIATRIC SURGERY      SLEEVE 2018    DILATION AND CURETTAGE OF UTERUS      GASTRECTOMY  2018    [facility]       FAMILY HISTORY:  Family History   Problem Relation Name Age of Onset    No Known Problems Paternal Grandfather      No Known Problems Paternal Grandmother       Cataracts Maternal Grandmother Uzma     Heart disease Maternal Grandmother Uzma     No Known Problems Maternal Grandfather      No Known Problems Father      Thyroid cancer Mother Bita     Hypertension Mother Bita     Cataracts Mother Bita     Cancer Mother Bita         Thyroid. Papillary    No Known Problems Brother      No Known Problems Sister      No Known Problems Maternal Aunt      No Known Problems Maternal Uncle      No Known Problems Paternal Aunt      No Known Problems Paternal Uncle      Breast cancer Neg Hx      Ovarian cancer Neg Hx      Colon cancer Neg Hx         ALLERGIES AND MEDICATIONS: updated and reviewed.  Review of patient's allergies indicates:  No Known Allergies  Current Outpatient Medications   Medication Sig Dispense Refill    pediatric multivit-iron-min (FLINTSTONES COMPLETE, IRON,) Chew Take 2 tablets by mouth once daily.      atorvastatin (LIPITOR) 10 MG tablet TAKE 1 TABLET BY MOUTH EVERY DAY 90 tablet 3    losartan (COZAAR) 50 MG tablet Take 1 tablet (50 mg total) by mouth once daily. 90 tablet 3    tirzepatide (MOUNJARO) 15 mg/0.5 mL PnIj Inject 15 mg into the skin every 7 days.      tirzepatide (MOUNJARO) 15 mg/0.5 mL PnIj Inject 0.5 mL (1 pen) into the skin once weekly 6 mL 0     No current facility-administered medications for this visit.       ROS  Review of Systems   Constitutional:  Negative for activity change, chills and fever.   HENT:  Negative for congestion and hearing loss.    Eyes:  Negative for pain and visual disturbance.   Respiratory:  Negative for cough and shortness of breath.    Cardiovascular:  Negative for chest pain and palpitations.   Gastrointestinal:  Negative for abdominal pain, constipation, diarrhea, nausea and vomiting.   Endocrine: Negative.    Genitourinary: Negative.    Musculoskeletal:  Negative for arthralgias and myalgias.   Skin: Negative.    Allergic/Immunologic: Negative.    Neurological:  Negative for dizziness, light-headedness and  "headaches.   Hematological: Negative.          OBJECTIVE     Physical Exam  Vitals:    06/06/24 1034   BP: 130/80   Pulse: 65    Body mass index is 42.51 kg/m².  Weight: 128.7 kg (283 lb 11.7 oz)   Height: 5' 8.5" (174 cm)     Physical Exam  Vitals reviewed.   Constitutional:       General: She is not in acute distress.     Appearance: Normal appearance. She is obese.   HENT:      Head: Normocephalic and atraumatic.      Mouth/Throat:      Mouth: Mucous membranes are moist.      Pharynx: Oropharynx is clear.   Eyes:      Extraocular Movements: Extraocular movements intact.      Conjunctiva/sclera: Conjunctivae normal.      Pupils: Pupils are equal, round, and reactive to light.   Cardiovascular:      Rate and Rhythm: Normal rate and regular rhythm.      Pulses: Normal pulses.      Heart sounds: Normal heart sounds.   Pulmonary:      Effort: Pulmonary effort is normal.      Breath sounds: Normal breath sounds.   Abdominal:      General: There is no distension.   Musculoskeletal:         General: Normal range of motion.      Cervical back: Normal range of motion and neck supple.   Skin:     General: Skin is warm and dry.   Neurological:      General: No focal deficit present.      Mental Status: She is alert.           Health Maintenance         Date Due Completion Date    Pneumococcal Vaccines (Age 0-64) (1 of 2 - PCV) Never done ---    Foot Exam Never done ---    Shingles Vaccine (1 of 2) Never done ---    Colorectal Cancer Screening 11/30/2024 11/30/2023    HIV Screening 04/12/2029 (Originally 6/9/1985) ---    Diabetes Urine Screening 04/23/2025 4/23/2024    Lipid Panel 04/29/2025 4/29/2024    Hemoglobin A1c 04/30/2025 10/31/2024    Mammogram 06/20/2025 6/20/2024    Eye Exam 09/12/2025 9/12/2024    Low Dose Statin 10/30/2025 10/30/2024    Cervical Cancer Screening 06/14/2028 6/14/2023    TETANUS VACCINE 10/30/2034 10/30/2024    RSV Vaccine (Age 60+ and Pregnant patients) (1 - 1-dose 75+ series) 06/09/2045 ---      "         ASSESSMENT     54 y.o. female with     1. Essential hypertension    2. Type 2 diabetes mellitus without complication, without long-term current use of insulin    3. Morbid obesity    4. Recurrent major depressive disorder, in partial remission        PLAN:     1. Essential hypertension  - Controlled on current regimen. Continue.   - Hypertension Digital Medicine (HDMP) Enrollment Order    2. Type 2 diabetes mellitus without complication, without long-term current use of insulin  - Controlled on current regimen. Continue.     3. Morbid obesity  - Counseled on increasing physical activity, diet.     4. Recurrent major depressive disorder, in partial remission  - Stable off of medication. Continue to monitor.         RTC in 6 months       Giuliano Crockett MD  Family Medicine  [facility]  11/29/2024    This document was created using voice recognition software ([employer] Direct). Although it may be edited, this document may contain errors related to incorrect recognition of the spoken word. Please call the physician if clarification is needed.       Follow up in about 6 months (around 12/6/2024) for Check up .

## 2024-06-10 ENCOUNTER — PATIENT MESSAGE (OUTPATIENT)
Dept: INTERNAL MEDICINE | Facility: CLINIC | Age: 54
End: 2024-06-10
Payer: COMMERCIAL

## 2024-06-10 RX ORDER — LOSARTAN POTASSIUM 50 MG/1
50 TABLET ORAL DAILY
Qty: 90 TABLET | Refills: 3 | Status: SHIPPED | OUTPATIENT
Start: 2024-06-10

## 2024-06-18 ENCOUNTER — CLINICAL SUPPORT (OUTPATIENT)
Dept: INTERNAL MEDICINE | Facility: CLINIC | Age: 54
End: 2024-06-18
Attending: STUDENT IN AN ORGANIZED HEALTH CARE EDUCATION/TRAINING PROGRAM
Payer: COMMERCIAL

## 2024-06-18 DIAGNOSIS — E11.9 TYPE 2 DIABETES MELLITUS WITHOUT COMPLICATION, UNSPECIFIED WHETHER LONG TERM INSULIN USE: ICD-10-CM

## 2024-06-18 PROCEDURE — 92228 IMG RTA DETC/MNTR DS PHY/QHP: CPT | Mod: 26,S$GLB,, | Performed by: OPTOMETRIST

## 2024-06-18 PROCEDURE — 2025F 7 FLD RTA PHOTO W/O RTNOPTHY: CPT | Mod: CPTII,S$GLB,, | Performed by: OPTOMETRIST

## 2024-06-18 PROCEDURE — 92228 IMG RTA DETC/MNTR DS PHY/QHP: CPT | Mod: TC,S$GLB,, | Performed by: STUDENT IN AN ORGANIZED HEALTH CARE EDUCATION/TRAINING PROGRAM

## 2024-06-18 NOTE — PROGRESS NOTES
Gabriella Gilbert is a 54 y.o. female here for a diabetic eye screening with non-dilated fundus photos per dr. Crockett.    Patient cooperative?: Yes      Small pupils?: Yes  Last eye exam: 4.13.22    For exam results, see Encounter Report.  Ashley More RN HC

## 2024-06-20 ENCOUNTER — HOSPITAL ENCOUNTER (OUTPATIENT)
Dept: RADIOLOGY | Facility: HOSPITAL | Age: 54
Discharge: HOME | End: 2024-06-20
Attending: STUDENT IN AN ORGANIZED HEALTH CARE EDUCATION/TRAINING PROGRAM
Payer: COMMERCIAL

## 2024-06-20 DIAGNOSIS — Z12.31 OTHER SCREENING MAMMOGRAM: ICD-10-CM

## 2024-06-20 PROCEDURE — 77067 SCR MAMMO BI INCL CAD: CPT | Mod: TC

## 2024-07-03 DIAGNOSIS — E11.9 TYPE 2 DIABETES MELLITUS WITHOUT COMPLICATION, WITHOUT LONG-TERM CURRENT USE OF INSULIN: ICD-10-CM

## 2024-07-03 RX ORDER — ATORVASTATIN CALCIUM 10 MG/1
10 TABLET, FILM COATED ORAL
Qty: 90 TABLET | Refills: 3 | Status: SHIPPED | OUTPATIENT
Start: 2024-07-03

## 2024-07-03 NOTE — TELEPHONE ENCOUNTER
Refill Decision Note   Gabriella Gilbert  is requesting a refill authorization.  Brief Assessment and Rationale for Refill:  Approve     Medication Therapy Plan:         Comments:     Note composed:4:00 AM 07/03/2024

## 2024-07-03 NOTE — TELEPHONE ENCOUNTER
No care due was identified.  Health [facility] Embedded Care Due Messages. Reference number: [id number].   7/03/2024 12:32:34 AM CDT

## 2024-07-11 ENCOUNTER — PATIENT OUTREACH (OUTPATIENT)
Dept: ADMINISTRATIVE | Facility: HOSPITAL | Age: 54
End: 2024-07-11
Payer: COMMERCIAL

## 2024-07-11 NOTE — PROGRESS NOTES
Health Maintenance Due   Topic Date Due    Pneumococcal Vaccines (Age 0-64) (1 of 2 - PCV) Never done    Foot Exam  Never done    Shingles Vaccine (1 of 2) Never done    COVID-19 Vaccine (4 - 2023-24 season) 09/01/2023    TETANUS VACCINE  05/13/2024     Triggered LINKS and reconciled immunizations. Updated Care Everywhere. Chart review completed as part of June [employer] Gap List report.

## 2024-09-10 ENCOUNTER — PATIENT MESSAGE (OUTPATIENT)
Dept: OPTOMETRY | Facility: CLINIC | Age: 54
End: 2024-09-10
Payer: COMMERCIAL

## 2024-09-12 ENCOUNTER — OFFICE VISIT (OUTPATIENT)
Dept: OPTOMETRY | Facility: CLINIC | Age: 54
End: 2024-09-12
Payer: COMMERCIAL

## 2024-09-12 DIAGNOSIS — H52.7 REFRACTIVE ERROR: ICD-10-CM

## 2024-09-12 DIAGNOSIS — E11.9 TYPE 2 DIABETES MELLITUS WITHOUT RETINOPATHY: Primary | ICD-10-CM

## 2024-09-12 PROCEDURE — 3066F NEPHROPATHY DOC TX: CPT | Mod: CPTII,S$GLB,, | Performed by: OPTOMETRIST

## 2024-09-12 PROCEDURE — 3061F NEG MICROALBUMINURIA REV: CPT | Mod: CPTII,S$GLB,, | Performed by: OPTOMETRIST

## 2024-09-12 PROCEDURE — 4010F ACE/ARB THERAPY RXD/TAKEN: CPT | Mod: CPTII,S$GLB,, | Performed by: OPTOMETRIST

## 2024-09-12 PROCEDURE — 99999 PR PBB SHADOW E&M-EST. PATIENT-LVL II: CPT | Mod: PBBFAC,,, | Performed by: OPTOMETRIST

## 2024-09-12 PROCEDURE — 92014 COMPRE OPH EXAM EST PT 1/>: CPT | Mod: S$GLB,,, | Performed by: OPTOMETRIST

## 2024-09-12 PROCEDURE — 1160F RVW MEDS BY RX/DR IN RCRD: CPT | Mod: CPTII,S$GLB,, | Performed by: OPTOMETRIST

## 2024-09-12 PROCEDURE — 2023F DILAT RTA XM W/O RTNOPTHY: CPT | Mod: CPTII,S$GLB,, | Performed by: OPTOMETRIST

## 2024-09-12 PROCEDURE — 92015 DETERMINE REFRACTIVE STATE: CPT | Mod: S$GLB,,, | Performed by: OPTOMETRIST

## 2024-09-12 PROCEDURE — 3044F HG A1C LEVEL LT 7.0%: CPT | Mod: CPTII,S$GLB,, | Performed by: OPTOMETRIST

## 2024-09-12 PROCEDURE — 1159F MED LIST DOCD IN RCRD: CPT | Mod: CPTII,S$GLB,, | Performed by: OPTOMETRIST

## 2024-09-12 NOTE — PROGRESS NOTES
HPI    DM eye exam OSVALDO 22    Pt needs updated specs Rx, pt wears readers and . Pt denies floaters,   flashes and gtts. DM controlled w aid. Pt had DM eye screening, wants to   know results    Hemoglobin A1C       Date                     Value               Ref Range             Status                2024               5.0                 4.0 - 5.6 %           Final                       2023               5.5                 4.0 - 5.6 %           Final                       2021               5.1                 4.0 - 5.6 %           Final                      ----------   Last edited by Carrie Heredia on 2024  3:20 PM.            Assessment /Plan     For exam results, see Encounter Report.    Type 2 diabetes mellitus without retinopathy    Refractive error      No diabetic retinopathy, no csme. Return in 1 year for dilated eye exam.  2. New Spectacle Rx given, discussed different options for glasses. RTC 1 year routine eye exam.

## 2024-09-26 ENCOUNTER — PATIENT MESSAGE (OUTPATIENT)
Dept: OTHER | Facility: OTHER | Age: 54
End: 2024-09-26
Payer: COMMERCIAL

## 2024-10-30 ENCOUNTER — OFFICE VISIT (OUTPATIENT)
Dept: FAMILY MEDICINE | Facility: CLINIC | Age: 54
End: 2024-10-30
Payer: COMMERCIAL

## 2024-10-30 VITALS
OXYGEN SATURATION: 98 % | BODY MASS INDEX: 40.26 KG/M2 | DIASTOLIC BLOOD PRESSURE: 76 MMHG | HEART RATE: 66 BPM | SYSTOLIC BLOOD PRESSURE: 136 MMHG | WEIGHT: 271.81 LBS | HEIGHT: 69 IN

## 2024-10-30 DIAGNOSIS — E11.9 TYPE 2 DIABETES MELLITUS WITHOUT COMPLICATION, WITHOUT LONG-TERM CURRENT USE OF INSULIN: ICD-10-CM

## 2024-10-30 DIAGNOSIS — Z23 NEED FOR TDAP VACCINATION: ICD-10-CM

## 2024-10-30 DIAGNOSIS — Z12.11 COLON CANCER SCREENING: ICD-10-CM

## 2024-10-30 DIAGNOSIS — Z02.0 SCHOOL PHYSICAL EXAM: Primary | ICD-10-CM

## 2024-10-30 DIAGNOSIS — Z11.1 SCREENING-PULMONARY TB: ICD-10-CM

## 2024-10-30 PROCEDURE — 90471 IMMUNIZATION ADMIN: CPT | Mod: S$GLB,,, | Performed by: NURSE PRACTITIONER

## 2024-10-30 PROCEDURE — 3044F HG A1C LEVEL LT 7.0%: CPT | Mod: CPTII,S$GLB,, | Performed by: NURSE PRACTITIONER

## 2024-10-30 PROCEDURE — 3008F BODY MASS INDEX DOCD: CPT | Mod: CPTII,S$GLB,, | Performed by: NURSE PRACTITIONER

## 2024-10-30 PROCEDURE — 86580 TB INTRADERMAL TEST: CPT | Mod: S$GLB,,, | Performed by: NURSE PRACTITIONER

## 2024-10-30 PROCEDURE — 4010F ACE/ARB THERAPY RXD/TAKEN: CPT | Mod: CPTII,S$GLB,, | Performed by: NURSE PRACTITIONER

## 2024-10-30 PROCEDURE — 3075F SYST BP GE 130 - 139MM HG: CPT | Mod: CPTII,S$GLB,, | Performed by: NURSE PRACTITIONER

## 2024-10-30 PROCEDURE — 1160F RVW MEDS BY RX/DR IN RCRD: CPT | Mod: CPTII,S$GLB,, | Performed by: NURSE PRACTITIONER

## 2024-10-30 PROCEDURE — 99213 OFFICE O/P EST LOW 20 MIN: CPT | Mod: 25,S$GLB,, | Performed by: NURSE PRACTITIONER

## 2024-10-30 PROCEDURE — 3066F NEPHROPATHY DOC TX: CPT | Mod: CPTII,S$GLB,, | Performed by: NURSE PRACTITIONER

## 2024-10-30 PROCEDURE — 90715 TDAP VACCINE 7 YRS/> IM: CPT | Mod: S$GLB,,, | Performed by: NURSE PRACTITIONER

## 2024-10-30 PROCEDURE — 1159F MED LIST DOCD IN RCRD: CPT | Mod: CPTII,S$GLB,, | Performed by: NURSE PRACTITIONER

## 2024-10-30 PROCEDURE — 3061F NEG MICROALBUMINURIA REV: CPT | Mod: CPTII,S$GLB,, | Performed by: NURSE PRACTITIONER

## 2024-10-30 PROCEDURE — 99999 PR PBB SHADOW E&M-EST. PATIENT-LVL III: CPT | Mod: PBBFAC,,, | Performed by: NURSE PRACTITIONER

## 2024-10-30 PROCEDURE — 3078F DIAST BP <80 MM HG: CPT | Mod: CPTII,S$GLB,, | Performed by: NURSE PRACTITIONER

## 2024-10-30 RX ORDER — TIRZEPATIDE 15 MG/.5ML
15 INJECTION, SOLUTION SUBCUTANEOUS
COMMUNITY

## 2024-10-31 ENCOUNTER — LAB VISIT (OUTPATIENT)
Dept: LAB | Facility: HOSPITAL | Age: 54
End: 2024-10-31
Attending: NURSE PRACTITIONER
Payer: COMMERCIAL

## 2024-10-31 DIAGNOSIS — E11.9 TYPE 2 DIABETES MELLITUS WITHOUT COMPLICATION, WITHOUT LONG-TERM CURRENT USE OF INSULIN: ICD-10-CM

## 2024-10-31 DIAGNOSIS — Z02.0 SCHOOL PHYSICAL EXAM: ICD-10-CM

## 2024-10-31 LAB
ALBUMIN SERPL BCP-MCNC: 3.9 G/DL (ref 3.5–5.2)
ALP SERPL-CCNC: 84 U/L (ref 40–150)
ALT SERPL W/O P-5'-P-CCNC: 15 U/L (ref 10–44)
ANION GAP SERPL CALC-SCNC: 11 MMOL/L (ref 8–16)
AST SERPL-CCNC: 22 U/L (ref 10–40)
BILIRUB SERPL-MCNC: 1.1 MG/DL (ref 0.1–1)
BUN SERPL-MCNC: 9 MG/DL (ref 6–20)
CALCIUM SERPL-MCNC: 9.1 MG/DL (ref 8.7–10.5)
CHLORIDE SERPL-SCNC: 106 MMOL/L (ref 95–110)
CO2 SERPL-SCNC: 26 MMOL/L (ref 23–29)
CREAT SERPL-MCNC: 0.8 MG/DL (ref 0.5–1.4)
EST. GFR  (NO RACE VARIABLE): >60 ML/MIN/1.73 M^2
ESTIMATED AVG GLUCOSE: 94 MG/DL (ref 68–131)
GLUCOSE SERPL-MCNC: 84 MG/DL (ref 70–110)
HBA1C MFR BLD: 4.9 % (ref 4–5.6)
HBV SURFACE AB SER-ACNC: 51.35 MIU/ML
HBV SURFACE AB SER-ACNC: REACTIVE M[IU]/ML
POTASSIUM SERPL-SCNC: 3.6 MMOL/L (ref 3.5–5.1)
PROT SERPL-MCNC: 7.5 G/DL (ref 6–8.4)
SODIUM SERPL-SCNC: 143 MMOL/L (ref 136–145)

## 2024-10-31 PROCEDURE — 86765 RUBEOLA ANTIBODY: CPT | Performed by: NURSE PRACTITIONER

## 2024-10-31 PROCEDURE — 83036 HEMOGLOBIN GLYCOSYLATED A1C: CPT | Performed by: NURSE PRACTITIONER

## 2024-10-31 PROCEDURE — 86706 HEP B SURFACE ANTIBODY: CPT | Mod: 91 | Performed by: NURSE PRACTITIONER

## 2024-10-31 PROCEDURE — 86762 RUBELLA ANTIBODY: CPT | Performed by: NURSE PRACTITIONER

## 2024-10-31 PROCEDURE — 36415 COLL VENOUS BLD VENIPUNCTURE: CPT | Mod: PO | Performed by: NURSE PRACTITIONER

## 2024-10-31 PROCEDURE — 86787 VARICELLA-ZOSTER ANTIBODY: CPT | Performed by: NURSE PRACTITIONER

## 2024-10-31 PROCEDURE — 80053 COMPREHEN METABOLIC PANEL: CPT | Performed by: NURSE PRACTITIONER

## 2024-10-31 PROCEDURE — 86735 MUMPS ANTIBODY: CPT | Performed by: NURSE PRACTITIONER

## 2024-11-01 ENCOUNTER — CLINICAL SUPPORT (OUTPATIENT)
Dept: FAMILY MEDICINE | Facility: CLINIC | Age: 54
End: 2024-11-01
Payer: COMMERCIAL

## 2024-11-01 VITALS — RESPIRATION RATE: 18 BRPM

## 2024-11-01 DIAGNOSIS — Z11.1 SCREENING-PULMONARY TB: Primary | ICD-10-CM

## 2024-11-01 LAB
MUMPS IGG INTERPRETATION: POSITIVE
MUMPS IGG SCREEN: 37.5 AU/ML
RUBEOLA IGG ANTIBODY: 111 AU/ML
RUBEOLA INTERPRETATION: POSITIVE
RUBV IGG SER-ACNC: 13.6 IU/ML
RUBV IGG SER-IMP: REACTIVE
TB INDURATION 48 - 72 HR READ: 0 MM
TB SKIN TEST 48 - 72 HR READ: NEGATIVE
VARICELLA INTERPRETATION: POSITIVE
VARICELLA ZOSTER IGG: 3300

## 2024-11-01 PROCEDURE — 99999 PR PBB SHADOW E&M-EST. PATIENT-LVL I: CPT | Mod: PBBFAC,,,

## 2025-05-14 DIAGNOSIS — E11.9 TYPE 2 DIABETES MELLITUS WITHOUT COMPLICATION: ICD-10-CM

## 2025-07-09 DIAGNOSIS — Z12.31 OTHER SCREENING MAMMOGRAM: ICD-10-CM
